# Patient Record
Sex: FEMALE | Race: WHITE | NOT HISPANIC OR LATINO | Employment: FULL TIME | URBAN - METROPOLITAN AREA
[De-identification: names, ages, dates, MRNs, and addresses within clinical notes are randomized per-mention and may not be internally consistent; named-entity substitution may affect disease eponyms.]

---

## 2017-11-06 LAB
EXTERNAL ABO GROUPING: NORMAL
EXTERNAL ANTIBODY SCREEN: NEGATIVE
EXTERNAL HEPATITIS B SURFACE ANTIGEN: NEGATIVE
EXTERNAL RH FACTOR: POSITIVE
EXTERNAL RUBELLA QUALITATIVE: NORMAL
EXTERNAL SYPHILIS RPR SCREEN: NORMAL
HIV1 P24 AG SERPL QL IA: NORMAL

## 2018-05-15 LAB — EXTERNAL GROUP B STREP ANTIGEN: NORMAL

## 2018-05-18 ENCOUNTER — HOSPITAL ENCOUNTER (OUTPATIENT)
Dept: LABOR AND DELIVERY | Facility: HOSPITAL | Age: 38
Setting detail: OBSERVATION
Discharge: HOME | End: 2018-05-18
Attending: OBSTETRICS & GYNECOLOGY | Admitting: OBSTETRICS & GYNECOLOGY

## 2018-05-18 VITALS
WEIGHT: 156 LBS | TEMPERATURE: 98 F | HEIGHT: 63 IN | SYSTOLIC BLOOD PRESSURE: 108 MMHG | RESPIRATION RATE: 17 BRPM | DIASTOLIC BLOOD PRESSURE: 64 MMHG | BODY MASS INDEX: 27.64 KG/M2 | OXYGEN SATURATION: 97 % | HEART RATE: 65 BPM

## 2018-05-18 PROBLEM — Z34.90 PREGNANCY: Status: ACTIVE | Noted: 2018-05-18

## 2018-05-18 LAB
ABO GROUP BLD: NORMAL
BASOPHILS # BLD AUTO: 0.03 10*3/MM3 (ref 0–0.2)
BASOPHILS NFR BLD AUTO: 0.4 % (ref 0–1.5)
BLD GP AB SCN SERPL QL: NEGATIVE
DEPRECATED RDW RBC AUTO: 45.8 FL (ref 37–54)
EOSINOPHIL # BLD AUTO: 0.12 10*3/MM3 (ref 0–0.7)
EOSINOPHIL NFR BLD AUTO: 1.5 % (ref 0.3–6.2)
ERYTHROCYTE [DISTWIDTH] IN BLOOD BY AUTOMATED COUNT: 13.2 % (ref 11.7–13)
EXPIRATION DATE: NORMAL
HCT VFR BLD AUTO: 33.8 % (ref 35.6–45.5)
HGB BLD-MCNC: 11.1 G/DL (ref 11.9–15.5)
IMM GRANULOCYTES # BLD: 0.1 10*3/MM3 (ref 0–0.03)
IMM GRANULOCYTES NFR BLD: 1.2 % (ref 0–0.5)
LYMPHOCYTES # BLD AUTO: 1.81 10*3/MM3 (ref 0.9–4.8)
LYMPHOCYTES NFR BLD AUTO: 22.2 % (ref 19.6–45.3)
Lab: NORMAL
MCH RBC QN AUTO: 31.2 PG (ref 26.9–32)
MCHC RBC AUTO-ENTMCNC: 32.8 G/DL (ref 32.4–36.3)
MCV RBC AUTO: 94.9 FL (ref 80.5–98.2)
MONOCYTES # BLD AUTO: 0.62 10*3/MM3 (ref 0.2–1.2)
MONOCYTES NFR BLD AUTO: 7.6 % (ref 5–12)
NEUTROPHILS # BLD AUTO: 5.47 10*3/MM3 (ref 1.9–8.1)
NEUTROPHILS NFR BLD AUTO: 67.1 % (ref 42.7–76)
PLATELET # BLD AUTO: 133 10*3/MM3 (ref 140–500)
PMV BLD AUTO: 11.2 FL (ref 6–12)
PROT UR STRIP-MCNC: NEGATIVE MG/DL
RBC # BLD AUTO: 3.56 10*6/MM3 (ref 3.9–5.2)
RH BLD: POSITIVE
T&S EXPIRATION DATE: NORMAL
WBC NRBC COR # BLD: 8.15 10*3/MM3 (ref 4.5–10.7)

## 2018-05-18 PROCEDURE — 86850 RBC ANTIBODY SCREEN: CPT | Performed by: OBSTETRICS & GYNECOLOGY

## 2018-05-18 PROCEDURE — G0378 HOSPITAL OBSERVATION PER HR: HCPCS

## 2018-05-18 PROCEDURE — 85025 COMPLETE CBC W/AUTO DIFF WBC: CPT | Performed by: OBSTETRICS & GYNECOLOGY

## 2018-05-18 PROCEDURE — 81002 URINALYSIS NONAUTO W/O SCOPE: CPT | Performed by: OBSTETRICS & GYNECOLOGY

## 2018-05-18 PROCEDURE — 86900 BLOOD TYPING SEROLOGIC ABO: CPT | Performed by: OBSTETRICS & GYNECOLOGY

## 2018-05-18 PROCEDURE — 86901 BLOOD TYPING SEROLOGIC RH(D): CPT | Performed by: OBSTETRICS & GYNECOLOGY

## 2018-05-18 PROCEDURE — 59025 FETAL NON-STRESS TEST: CPT

## 2018-05-18 PROCEDURE — 59412 ANTEPARTUM MANIPULATION: CPT

## 2018-05-18 RX ORDER — BUTORPHANOL TARTRATE 1 MG/ML
1 INJECTION, SOLUTION INTRAMUSCULAR; INTRAVENOUS ONCE AS NEEDED
Status: DISCONTINUED | OUTPATIENT
Start: 2018-05-18 | End: 2018-05-18 | Stop reason: HOSPADM

## 2018-05-18 RX ORDER — TERBUTALINE SULFATE 1 MG/ML
0.25 INJECTION, SOLUTION SUBCUTANEOUS ONCE
Status: DISCONTINUED | OUTPATIENT
Start: 2018-05-18 | End: 2018-05-18 | Stop reason: HOSPADM

## 2018-05-18 RX ORDER — SODIUM CHLORIDE, SODIUM LACTATE, POTASSIUM CHLORIDE, CALCIUM CHLORIDE 600; 310; 30; 20 MG/100ML; MG/100ML; MG/100ML; MG/100ML
125 INJECTION, SOLUTION INTRAVENOUS CONTINUOUS
Status: DISCONTINUED | OUTPATIENT
Start: 2018-05-18 | End: 2018-05-18 | Stop reason: HOSPADM

## 2018-05-18 RX ORDER — LIDOCAINE HYDROCHLORIDE 10 MG/ML
5 INJECTION, SOLUTION EPIDURAL; INFILTRATION; INTRACAUDAL; PERINEURAL AS NEEDED
Status: DISCONTINUED | OUTPATIENT
Start: 2018-05-18 | End: 2018-05-18 | Stop reason: HOSPADM

## 2018-05-18 RX ORDER — PRENATAL VIT NO.126/IRON/FOLIC 28MG-0.8MG
1 TABLET ORAL DAILY
COMMUNITY
End: 2022-03-04

## 2018-05-18 RX ORDER — TERBUTALINE SULFATE 1 MG/ML
0.25 INJECTION, SOLUTION SUBCUTANEOUS ONCE AS NEEDED
Status: DISCONTINUED | OUTPATIENT
Start: 2018-05-18 | End: 2018-05-18 | Stop reason: HOSPADM

## 2018-05-18 RX ORDER — SODIUM CHLORIDE 0.9 % (FLUSH) 0.9 %
1-10 SYRINGE (ML) INJECTION AS NEEDED
Status: DISCONTINUED | OUTPATIENT
Start: 2018-05-18 | End: 2018-05-18 | Stop reason: HOSPADM

## 2018-05-18 RX ADMIN — SODIUM CHLORIDE, POTASSIUM CHLORIDE, SODIUM LACTATE AND CALCIUM CHLORIDE 125 ML/HR: 600; 310; 30; 20 INJECTION, SOLUTION INTRAVENOUS at 08:50

## 2018-05-18 NOTE — H&P
[facility]  Obstetric History and Physical    Patient Name: Starr Ramos  :  1980  MRN:  [id number]      Chief Complaint   Patient presents with   • external version     Sceduled version at 1000. Reports +FM, denies any vaginal bleeding or LOF.        Subjective     Patient is a 37 y.o. female  currently at 37w0d, who presents for attempted version. U/S done yesterday showing baby barry breech, PAUL 7.2 with MVP 3.  Good FM, no UCs       Her prenatal care has been uncomplicated      Objective       Vital Signs Range for the last 24 hours  Temperature: Temp:  [97.7 °F (36.5 °C)] 97.7 °F (36.5 °C)   Temp Source: Temp src: Oral   BP: BP: (119)/(65) 119/65   Pulse: Heart Rate:  [62] 62   Respirations: Resp:  [19] 19                   Physical Examination:     General :  Alert in NAD  Abdomen: Gravid, EFW 6.5lbs by leopolds    Presentation: Barry breech, confirmed with BSUS   Cervix: Exam by:     Dilation:     Effacement:     Station:         Fetal Heart Rate Assessment   Method: Fetal HR Assessment Method: external   Beats/min: Fetal HR (beats/min): 140   Baseline: Fetal Heart Baseline Rate: normal range   Varibility: Fetal HR Variability: moderate (amplitude range 6 to 25 bpm)   Accels: Fetal HR Accelerations: greater than/equal to 15 bpm, lasting at least 15 seconds   Decels: Fetal HR Decelerations: absent   Tracing Category:       Uterine Assessment   Method: Method: external tocotransducer   Frequency (min): Contraction Frequency (Minutes): none   Ctx Count in 10 min:     Duration:     Intensity: Contraction Intensity: no contractions   Intensity by IUPC:     Resting Tone: Uterine Resting Tone: soft by palpation   Resting Tone by IUPC:     [place] Units:             Results from last 7 days  Lab Units 18  0852   WBC 10*3/mm3 8.15   HEMOGLOBIN g/dL 11.1*   HEMATOCRIT % 33.8*   PLATELETS 10*3/mm3 133*       Assessment/Plan     1.  Intrauterine pregnancy at 37w0d weeks gestation for attempted  version.   reactive fetal status.   Plan of care has been reviewed with patient and family.  All questions answered.    Active Problems:    Pregnancy        H&P updated. The patient was examined and the following changes are noted above.         Jackie Feliz MD  5/18/2018  10:22 AM

## 2018-05-18 NOTE — NON STRESS TEST
Starr IVAN Ramos, a  at 37w0d with an COSTA of 2018, by Patient Reported, was seen at [facility] LABOR DELIVERY for a nonstress test.    Fetal monitoring from [id number].    Chief Complaint   Patient presents with   • external version     Sceduled version at 1000. Reports +FM, denies any vaginal bleeding or LOF.        Interpretation A  Nonstress Test Interpretation A: Reactive (18 1130 : Patti Collado RN)

## 2018-05-18 NOTE — PROCEDURES
After informed consent obtained, U/S performed - fetus olaf breech, head midline with fetal spine on maternal left.  Front roll attempted first and then back roll - neither of which resulted in much movement.  Fetus tolerated maneuvers well.  Procedure aborted.  Will complete prolonged monitoring and d/c home if reassuring.  Blood type O+.  Has appt early next week with U/S to f/u.    Jackie Feliz MD

## 2018-06-01 ENCOUNTER — HOSPITAL ENCOUNTER (INPATIENT)
Facility: HOSPITAL | Age: 38
LOS: 4 days | Discharge: HOME | End: 2018-06-05
Attending: OBSTETRICS & GYNECOLOGY | Admitting: OBSTETRICS & GYNECOLOGY

## 2018-06-01 ENCOUNTER — ANESTHESIA (OUTPATIENT)
Dept: LABOR AND DELIVERY | Facility: HOSPITAL | Age: 38
End: 2018-06-01

## 2018-06-01 ENCOUNTER — ANESTHESIA EVENT (OUTPATIENT)
Dept: LABOR AND DELIVERY | Facility: HOSPITAL | Age: 38
End: 2018-06-01

## 2018-06-01 LAB
ABO GROUP BLD: NORMAL
ATMOSPHERIC PRESS: 745.6 MMHG
BASE EXCESS BLDCOA CALC-SCNC: -4.7 MMOL/L
BDY SITE: ABNORMAL
BLD GP AB SCN SERPL QL: NEGATIVE
DEPRECATED RDW RBC AUTO: 45.8 FL (ref 37–54)
ERYTHROCYTE [DISTWIDTH] IN BLOOD BY AUTOMATED COUNT: 13.5 % (ref 11.7–13)
GAS FLOW AIRWAY: 2 LPM
HCO3 BLDCOA-SCNC: 24.4 MMOL/L (ref 22–28)
HCT VFR BLD AUTO: 34.1 % (ref 35.6–45.5)
HGB BLD-MCNC: 11.3 G/DL (ref 11.9–15.5)
MCH RBC QN AUTO: 30.9 PG (ref 26.9–32)
MCHC RBC AUTO-ENTMCNC: 33.1 G/DL (ref 32.4–36.3)
MCV RBC AUTO: 93.2 FL (ref 80.5–98.2)
MODALITY: ABNORMAL
PCO2 BLDCOA: 61.2 MMHG
PH BLDCOA: 7.21 PH UNITS (ref 7.18–7.34)
PLATELET # BLD AUTO: 141 10*3/MM3 (ref 140–500)
PMV BLD AUTO: 10.7 FL (ref 6–12)
PO2 BLDCOA: <25.2 MMHG (ref 12–26)
RBC # BLD AUTO: 3.66 10*6/MM3 (ref 3.9–5.2)
RH BLD: POSITIVE
SAO2 % BLDCOA: 14.4 % (ref 92–99)
T&S EXPIRATION DATE: NORMAL
WBC NRBC COR # BLD: 7.24 10*3/MM3 (ref 4.5–10.7)

## 2018-06-01 PROCEDURE — 85027 COMPLETE CBC AUTOMATED: CPT | Performed by: OBSTETRICS & GYNECOLOGY

## 2018-06-01 PROCEDURE — 25010000003 CEFAZOLIN IN DEXTROSE 2-4 GM/100ML-% SOLUTION: Performed by: OBSTETRICS & GYNECOLOGY

## 2018-06-01 PROCEDURE — 25010000002 FENTANYL CITRATE (PF) 100 MCG/2ML SOLUTION: Performed by: ANESTHESIOLOGY

## 2018-06-01 PROCEDURE — 86901 BLOOD TYPING SEROLOGIC RH(D): CPT | Performed by: OBSTETRICS & GYNECOLOGY

## 2018-06-01 PROCEDURE — 25010000002 MORPHINE PER 10 MG: Performed by: ANESTHESIOLOGY

## 2018-06-01 PROCEDURE — 25010000002 ONDANSETRON PER 1 MG: Performed by: ANESTHESIOLOGY

## 2018-06-01 PROCEDURE — 25010000002 PHENYLEPHRINE PER 1 ML: Performed by: ANESTHESIOLOGY

## 2018-06-01 PROCEDURE — 86900 BLOOD TYPING SEROLOGIC ABO: CPT | Performed by: OBSTETRICS & GYNECOLOGY

## 2018-06-01 PROCEDURE — 82803 BLOOD GASES ANY COMBINATION: CPT

## 2018-06-01 PROCEDURE — 86850 RBC ANTIBODY SCREEN: CPT | Performed by: OBSTETRICS & GYNECOLOGY

## 2018-06-01 RX ORDER — PROMETHAZINE HYDROCHLORIDE 25 MG/1
25 TABLET ORAL EVERY 6 HOURS PRN
Status: DISCONTINUED | OUTPATIENT
Start: 2018-06-01 | End: 2018-06-05 | Stop reason: HOSPADM

## 2018-06-01 RX ORDER — IBUPROFEN 800 MG/1
800 TABLET ORAL EVERY 8 HOURS PRN
Status: DISCONTINUED | OUTPATIENT
Start: 2018-06-01 | End: 2018-06-05 | Stop reason: HOSPADM

## 2018-06-01 RX ORDER — PRENATAL VIT NO.126/IRON/FOLIC 28MG-0.8MG
1 TABLET ORAL DAILY
Status: DISCONTINUED | OUTPATIENT
Start: 2018-06-01 | End: 2018-06-05 | Stop reason: HOSPADM

## 2018-06-01 RX ORDER — BUPIVACAINE HYDROCHLORIDE 5 MG/ML
INJECTION, SOLUTION EPIDURAL; INTRACAUDAL AS NEEDED
Status: DISCONTINUED | OUTPATIENT
Start: 2018-06-01 | End: 2018-06-01 | Stop reason: SURG

## 2018-06-01 RX ORDER — ONDANSETRON 2 MG/ML
4 INJECTION INTRAMUSCULAR; INTRAVENOUS ONCE AS NEEDED
Status: DISPENSED | OUTPATIENT
Start: 2018-06-01 | End: 2018-06-02

## 2018-06-01 RX ORDER — ONDANSETRON 4 MG/1
4 TABLET, ORALLY DISINTEGRATING ORAL EVERY 6 HOURS PRN
Status: DISCONTINUED | OUTPATIENT
Start: 2018-06-01 | End: 2018-06-05 | Stop reason: HOSPADM

## 2018-06-01 RX ORDER — SODIUM CHLORIDE, SODIUM LACTATE, POTASSIUM CHLORIDE, CALCIUM CHLORIDE 600; 310; 30; 20 MG/100ML; MG/100ML; MG/100ML; MG/100ML
125 INJECTION, SOLUTION INTRAVENOUS CONTINUOUS
Status: DISCONTINUED | OUTPATIENT
Start: 2018-06-01 | End: 2018-06-04 | Stop reason: HOSPADM

## 2018-06-01 RX ORDER — FENTANYL CITRATE 50 UG/ML
INJECTION, SOLUTION INTRAMUSCULAR; INTRAVENOUS AS NEEDED
Status: DISCONTINUED | OUTPATIENT
Start: 2018-06-01 | End: 2018-06-01 | Stop reason: SURG

## 2018-06-01 RX ORDER — EPHEDRINE SULFATE 50 MG/ML
INJECTION, SOLUTION INTRAVENOUS AS NEEDED
Status: DISCONTINUED | OUTPATIENT
Start: 2018-06-01 | End: 2018-06-01 | Stop reason: SURG

## 2018-06-01 RX ORDER — NALOXONE HCL 0.4 MG/ML
0.2 VIAL (ML) INJECTION
Status: DISCONTINUED | OUTPATIENT
Start: 2018-06-01 | End: 2018-06-05 | Stop reason: HOSPADM

## 2018-06-01 RX ORDER — MORPHINE SULFATE 2 MG/ML
2 INJECTION, SOLUTION INTRAMUSCULAR; INTRAVENOUS EVERY 4 HOURS PRN
Status: DISCONTINUED | OUTPATIENT
Start: 2018-06-01 | End: 2018-06-05 | Stop reason: HOSPADM

## 2018-06-01 RX ORDER — FAMOTIDINE 10 MG/ML
20 INJECTION, SOLUTION INTRAVENOUS ONCE
Status: COMPLETED | OUTPATIENT
Start: 2018-06-01 | End: 2018-06-01

## 2018-06-01 RX ORDER — PROMETHAZINE HYDROCHLORIDE 25 MG/1
12.5 SUPPOSITORY RECTAL EVERY 6 HOURS PRN
Status: DISCONTINUED | OUTPATIENT
Start: 2018-06-01 | End: 2018-06-05 | Stop reason: HOSPADM

## 2018-06-01 RX ORDER — LANOLIN 100 %
OINTMENT (GRAM) TOPICAL
Status: DISCONTINUED | OUTPATIENT
Start: 2018-06-01 | End: 2018-06-05 | Stop reason: HOSPADM

## 2018-06-01 RX ORDER — DIPHENHYDRAMINE HCL 25 MG
25 CAPSULE ORAL EVERY 4 HOURS PRN
Status: DISCONTINUED | OUTPATIENT
Start: 2018-06-01 | End: 2018-06-05 | Stop reason: HOSPADM

## 2018-06-01 RX ORDER — OXYTOCIN-SODIUM CHLORIDE 0.9% IV SOLN 30 UNIT/500ML 30-0.9/5 UT/ML-%
250 SOLUTION INTRAVENOUS CONTINUOUS
Status: ACTIVE | OUTPATIENT
Start: 2018-06-01 | End: 2018-06-01

## 2018-06-01 RX ORDER — ONDANSETRON 4 MG/1
4 TABLET, FILM COATED ORAL EVERY 6 HOURS PRN
Status: DISCONTINUED | OUTPATIENT
Start: 2018-06-01 | End: 2018-06-05 | Stop reason: HOSPADM

## 2018-06-01 RX ORDER — NALOXONE HCL 0.4 MG/ML
0.4 VIAL (ML) INJECTION
Status: DISCONTINUED | OUTPATIENT
Start: 2018-06-01 | End: 2018-06-05 | Stop reason: HOSPADM

## 2018-06-01 RX ORDER — CARBOPROST TROMETHAMINE 250 UG/ML
250 INJECTION, SOLUTION INTRAMUSCULAR AS NEEDED
Status: DISCONTINUED | OUTPATIENT
Start: 2018-06-01 | End: 2018-06-01 | Stop reason: HOSPADM

## 2018-06-01 RX ORDER — DIPHENHYDRAMINE HYDROCHLORIDE 50 MG/ML
25 INJECTION INTRAMUSCULAR; INTRAVENOUS EVERY 4 HOURS PRN
Status: DISCONTINUED | OUTPATIENT
Start: 2018-06-01 | End: 2018-06-05 | Stop reason: HOSPADM

## 2018-06-01 RX ORDER — OXYTOCIN-SODIUM CHLORIDE 0.9% IV SOLN 30 UNIT/500ML 30-0.9/5 UT/ML-%
125 SOLUTION INTRAVENOUS CONTINUOUS PRN
Status: COMPLETED | OUTPATIENT
Start: 2018-06-01 | End: 2018-06-01

## 2018-06-01 RX ORDER — DOCUSATE SODIUM 100 MG/1
100 CAPSULE, LIQUID FILLED ORAL 2 TIMES DAILY PRN
Status: DISCONTINUED | OUTPATIENT
Start: 2018-06-01 | End: 2018-06-05 | Stop reason: HOSPADM

## 2018-06-01 RX ORDER — OXYTOCIN-SODIUM CHLORIDE 0.9% IV SOLN 30 UNIT/500ML 30-0.9/5 UT/ML-%
999 SOLUTION INTRAVENOUS ONCE
Status: COMPLETED | OUTPATIENT
Start: 2018-06-01 | End: 2018-06-01

## 2018-06-01 RX ORDER — MISOPROSTOL 200 UG/1
600 TABLET ORAL ONCE AS NEEDED
Status: DISCONTINUED | OUTPATIENT
Start: 2018-06-01 | End: 2018-06-05 | Stop reason: HOSPADM

## 2018-06-01 RX ORDER — METHYLERGONOVINE MALEATE 0.2 MG/ML
200 INJECTION INTRAVENOUS ONCE AS NEEDED
Status: DISCONTINUED | OUTPATIENT
Start: 2018-06-01 | End: 2018-06-01 | Stop reason: HOSPADM

## 2018-06-01 RX ORDER — BISACODYL 10 MG
10 SUPPOSITORY, RECTAL RECTAL DAILY PRN
Status: DISCONTINUED | OUTPATIENT
Start: 2018-06-01 | End: 2018-06-05 | Stop reason: HOSPADM

## 2018-06-01 RX ORDER — ONDANSETRON 2 MG/ML
4 INJECTION INTRAMUSCULAR; INTRAVENOUS EVERY 6 HOURS PRN
Status: DISCONTINUED | OUTPATIENT
Start: 2018-06-01 | End: 2018-06-05 | Stop reason: HOSPADM

## 2018-06-01 RX ORDER — HYDROXYZINE 50 MG/1
50 TABLET, FILM COATED ORAL EVERY 6 HOURS PRN
Status: DISCONTINUED | OUTPATIENT
Start: 2018-06-01 | End: 2018-06-05 | Stop reason: HOSPADM

## 2018-06-01 RX ORDER — ACETAMINOPHEN 500 MG
1000 TABLET ORAL ONCE
Status: COMPLETED | OUTPATIENT
Start: 2018-06-01 | End: 2018-06-01

## 2018-06-01 RX ORDER — PROMETHAZINE HYDROCHLORIDE 25 MG/ML
12.5 INJECTION, SOLUTION INTRAMUSCULAR; INTRAVENOUS EVERY 6 HOURS PRN
Status: DISCONTINUED | OUTPATIENT
Start: 2018-06-01 | End: 2018-06-05 | Stop reason: HOSPADM

## 2018-06-01 RX ORDER — OXYCODONE HYDROCHLORIDE AND ACETAMINOPHEN 5; 325 MG/1; MG/1
2 TABLET ORAL EVERY 4 HOURS PRN
Status: DISCONTINUED | OUTPATIENT
Start: 2018-06-01 | End: 2018-06-05 | Stop reason: HOSPADM

## 2018-06-01 RX ORDER — HYDROMORPHONE HYDROCHLORIDE 1 MG/ML
0.5 INJECTION, SOLUTION INTRAMUSCULAR; INTRAVENOUS; SUBCUTANEOUS
Status: DISCONTINUED | OUTPATIENT
Start: 2018-06-01 | End: 2018-06-05 | Stop reason: HOSPADM

## 2018-06-01 RX ORDER — MISOPROSTOL 200 UG/1
800 TABLET ORAL AS NEEDED
Status: DISCONTINUED | OUTPATIENT
Start: 2018-06-01 | End: 2018-06-01 | Stop reason: HOSPADM

## 2018-06-01 RX ORDER — ACETAMINOPHEN 325 MG/1
650 TABLET ORAL EVERY 4 HOURS PRN
Status: DISCONTINUED | OUTPATIENT
Start: 2018-06-01 | End: 2018-06-05 | Stop reason: HOSPADM

## 2018-06-01 RX ORDER — MORPHINE SULFATE 2 MG/ML
2 INJECTION, SOLUTION INTRAMUSCULAR; INTRAVENOUS
Status: ACTIVE | OUTPATIENT
Start: 2018-06-01 | End: 2018-06-02

## 2018-06-01 RX ORDER — OXYCODONE HYDROCHLORIDE AND ACETAMINOPHEN 5; 325 MG/1; MG/1
1 TABLET ORAL EVERY 4 HOURS PRN
Status: DISCONTINUED | OUTPATIENT
Start: 2018-06-01 | End: 2018-06-05 | Stop reason: HOSPADM

## 2018-06-01 RX ORDER — CEFAZOLIN SODIUM 2 G/100ML
2 INJECTION, SOLUTION INTRAVENOUS ONCE
Status: COMPLETED | OUTPATIENT
Start: 2018-06-01 | End: 2018-06-01

## 2018-06-01 RX ORDER — MORPHINE SULFATE 1 MG/ML
INJECTION, SOLUTION EPIDURAL; INTRATHECAL; INTRAVENOUS AS NEEDED
Status: DISCONTINUED | OUTPATIENT
Start: 2018-06-01 | End: 2018-06-01 | Stop reason: SURG

## 2018-06-01 RX ORDER — SIMETHICONE 80 MG
80 TABLET,CHEWABLE ORAL 4 TIMES DAILY PRN
Status: DISCONTINUED | OUTPATIENT
Start: 2018-06-01 | End: 2018-06-05 | Stop reason: HOSPADM

## 2018-06-01 RX ADMIN — EPHEDRINE SULFATE 25 MG: 50 INJECTION INTRAMUSCULAR; INTRAVENOUS; SUBCUTANEOUS at 10:55

## 2018-06-01 RX ADMIN — FENTANYL CITRATE 20 MCG: 50 INJECTION INTRAMUSCULAR; INTRAVENOUS at 10:53

## 2018-06-01 RX ADMIN — CEFAZOLIN SODIUM 2 G: 2 INJECTION, SOLUTION INTRAVENOUS at 10:37

## 2018-06-01 RX ADMIN — BUPIVACAINE HYDROCHLORIDE 2.4 ML: 5 INJECTION, SOLUTION EPIDURAL; INTRACAUDAL; PERINEURAL at 10:53

## 2018-06-01 RX ADMIN — ONDANSETRON 4 MG: 2 INJECTION INTRAMUSCULAR; INTRAVENOUS at 13:57

## 2018-06-01 RX ADMIN — OXYTOCIN 999 ML/HR: 10 INJECTION INTRAVENOUS at 11:14

## 2018-06-01 RX ADMIN — ACETAMINOPHEN 1000 MG: 500 TABLET, FILM COATED ORAL at 10:09

## 2018-06-01 RX ADMIN — SODIUM CHLORIDE, POTASSIUM CHLORIDE, SODIUM LACTATE AND CALCIUM CHLORIDE: 600; 310; 30; 20 INJECTION, SOLUTION INTRAVENOUS at 10:48

## 2018-06-01 RX ADMIN — IBUPROFEN 800 MG: 800 TABLET ORAL at 14:44

## 2018-06-01 RX ADMIN — OXYTOCIN 125 ML/HR: 10 INJECTION, SOLUTION INTRAMUSCULAR; INTRAVENOUS at 12:30

## 2018-06-01 RX ADMIN — MORPHINE SULFATE 0.25 MG: 1 INJECTION EPIDURAL; INTRATHECAL; INTRAVENOUS at 10:53

## 2018-06-01 RX ADMIN — FAMOTIDINE 20 MG: 10 INJECTION, SOLUTION INTRAVENOUS at 10:10

## 2018-06-01 RX ADMIN — PHENYLEPHRINE HYDROCHLORIDE 100 MCG: 10 INJECTION INTRAVENOUS at 10:56

## 2018-06-01 RX ADMIN — PHENYLEPHRINE HYDROCHLORIDE 100 MCG: 10 INJECTION INTRAVENOUS at 11:00

## 2018-06-01 RX ADMIN — SODIUM CHLORIDE, POTASSIUM CHLORIDE, SODIUM LACTATE AND CALCIUM CHLORIDE 1000 ML: 600; 310; 30; 20 INJECTION, SOLUTION INTRAVENOUS at 10:00

## 2018-06-01 RX ADMIN — OXYCODONE HYDROCHLORIDE AND ACETAMINOPHEN 2 TABLET: 5; 325 TABLET ORAL at 14:44

## 2018-06-01 NOTE — ANESTHESIA PREPROCEDURE EVALUATION
Anesthesia Evaluation     Patient summary reviewed and Nursing notes reviewed   NPO Solid Status: > 6 hours  NPO Liquid Status: > 6 hours           Airway   Mallampati: I  TM distance: <3 FB  Neck ROM: full  no difficulty expected  Dental - normal exam     Pulmonary - negative pulmonary ROS and normal exam   Cardiovascular - normal exam  Exercise tolerance: good (4-7 METS)    (+) valvular problems/murmurs,       Neuro/Psych  (+) headaches,     GI/Hepatic/Renal/Endo    (+)   hypothyroidism,     Musculoskeletal (-) negative ROS    Abdominal  - normal exam    Bowel sounds: normal.   Substance History - negative use     OB/GYN    (+) Pregnant,         Other                        Anesthesia Plan    ASA 2     spinal     Anesthetic plan and risks discussed with patient.

## 2018-06-01 NOTE — ANESTHESIA POSTPROCEDURE EVALUATION
Patient: Starr Ramos    Procedure Summary     Date:  18 Room / Location:   [facility] /  [facility]    Anesthesia Start:  1050 Anesthesia Stop:  1145    Procedure:   SECTION PRIMARY (N/A Abdomen) Diagnosis:  (breech)    Surgeon:  Ted Martinez MD Provider:  Mark Chan MD    Anesthesia Type:  spinal ASA Status:  2          Anesthesia Type: spinal  Last vitals  BP   118/73 (18 1247)   Temp   36.3 °C (97.4 °F) (18 1145)   Pulse   67 (18 1247)   Resp   16 (18 1245)     SpO2   98 % (18 1246)     Post Anesthesia Care and Evaluation    Patient location during evaluation: PACU  Anesthetic complications: No anesthetic complications

## 2018-06-01 NOTE — H&P
"/78 (BP Location: Right arm, Patient Position: Sitting)   Pulse 69   Temp 98.2 °F (36.8 °C) (Oral)   Resp 16   Ht 160 cm (63\")   Wt 71.2 kg (157 lb)   SpO2 98%   Breastfeeding? Yes   BMI 27.81 kg/m²       H&P reviewed. The patient was examined and there are no changes to the H&P.    Ted Martinez MD  6/1/2018  10:45 AM      "

## 2018-06-01 NOTE — ANESTHESIA PROCEDURE NOTES
Intrathecal Block    Patient location during procedure: OR  Indication:at surgeon's request  Performed By  Anesthesiologist: TRAVIS DESIR  Preanesthetic Checklist  Completed: patient identified, site marked, surgical consent, pre-op evaluation, timeout performed, IV checked, risks and benefits discussed and monitors and equipment checked  Intrathecal Block Prep:  Pt Position:sitting  Sterile Tech:sterile barrier, gloves, cap and mask  Prep:chloraprep  Monitoring:blood pressure monitoring, continuous pulse oximetry and EKG  Intrathecal Block Procedure:  Approach:midline  Guidance:landmark technique  Location:L3-L4  Needle Type:Sprotte  Needle Gauge:24 G  Placement of Needle Event: cerebrospinal fluid  Fluid Appearance:clear  Post Assessment:  Patient Tolerance:patient tolerated the procedure well with no apparent complications

## 2018-06-01 NOTE — OP NOTE
[facility]   Section Operative Note    Patient Name: Starr Ramos  :  1980  MRN:  [id number]      Date of procedure:  2018        Pre-Operative Dx:   1.  IUP at 39w0d weeks   2.   Breech Presentation s/p unsuccessful ECV @ 37 wks        Postoperative Dx:  Same        Procedure:   Primary Low Transverse  Section via Pfannenstiel     Surgeon: Ted Martinez MD      Assistant: Archana Mccain MD     Anesthesia:   Spinal    EBL: 800 ml         Specimens: A: cord ph  B: cord blood  C: placenta      Findings:                           Amniotic Fluid - thin meconium  Placenta Intact, 3 VC  Uterus normal  Tubes and ovaries normal bilaterally              Infant:            Gender: Viable male  infant     Weight: 3280 g (7 lb 3.7 oz)     Apgars: 8   @ 1 minute /     8   @ 5 minutes                 Indication for C/Section:       Breech presentation               Procedure Details:  The patient was taken to the operating room where spinal anesthesia was found to be adequate. She was prepped and draped in the usual sterile manner in the dorsal supine position with leftward tilt. A Pfannenstiel incision was made and carried down to the fascia. The fascia was incised in the mid-line and extended transversely with Bedolla scissors. The fascia was grasped and elevated and  from the underlying rectus muscles superiorly and inferiorly. The peritoneum was identified and entered. Peritoneal incision was extended superiorly and inferiorly with good visualization of the bladder. The bladder blade was inserted and the vesicouterine peritoneum was incised transversely and the bladder flap was created digitally. The bladder blade was reinserted. The  lower uterine segment was incised in a transverse fashion.  The breech was delivered through the incision, then  remainder of the infant was delivered without difficulty. The head was flexed and delivered easily. The umbilical cord was clamped and cut  and the infant was handed off the field. Cord ph and cord bloods were obtained. The placenta was removed intact and appeared normal. The uterine incision was closed with running locked sutures of 0 Monocryl. Second layer closure was placed imbricating the first for hemostasis. The abdominal cavity was irrigated and cleared of all clot and debris. The uterine incision was inspected and noted to be hemostatic. Rectus muscles were re approximated in the midline with 0 Vicryl.  The fascia was closed with 0 PDS in running continuous fashion. The subcutaneous tissue was closed with 3-0 Vicryl. The skin was closed in subcuticular fashion with 4-0 Vicryl.   Instrument, sponge, and needle counts were correct prior the abdominal closure and at the conclusion of the case. Mother and baby  to recovery room in stable condition.              Complications:     None          Antibiotics: cefazolin (Ancef)                      Ted Martinez MD  6/1/2018  11:38 AM

## 2018-06-01 NOTE — LACTATION NOTE
P1. Term infant.  Pt states infant has had some successful sucks at the breast.  Attempted a feeding now but infant sleepy.  Pt to do CECI and hand express drops.    Lactation Consult Note    Evaluation Completed: 2018 7:14 PM  Patient Name: Starr Ramos  :  1980  MRN:  [id number]     REFERRAL  INFORMATION:                          Date of Referral: 18   Person Making Referral: patient  Maternal Reason for Referral: breastfeeding currently       DELIVERY HISTORY:          Skin to skin initiation date/time: 2018  11:30 AM   Skin to skin end date/time:              MATERNAL ASSESSMENT:  Breast Size Issue: none (18 : Mally Ortega RN)  Breast Shape: Bilateral:, round (18 : Mally Ortega RN)  Breast Density: Bilateral:, soft (18 : Mally Ortega RN)  Areola: Bilateral:, elastic (18 : Mally Ortega RN)  Nipples: Bilateral:, everted (18 : Mally Ortega RN)                INFANT ASSESSMENT:  Information for the patient's :  Ramos Ramos [[id number]]   No past medical history on file.    Feeding Readiness Cues: quiet (18Alfredo : Mally Ortega RN)   Feeding Method: breastfeeding (18 : Mally Ortega RN)   Feeding Tolerance/Success: sleepy (18Alfredo : Mally Ortega RN)                           Additional Documentation: LATCH Score (Group) (18Alfredo : Mally Ortega RN)           Breastfeeding: breastfeeding, bilateral (18 : Mally Ortega, RN)   Infant Positioning: clutch/football, cradle, cross-cradle, laid back (ventral) (18Alfredo : Mally Ortega, ISAIAS)           Effective Latch During Feeding: no (18Alfredo : Mally Ortega RN)   Suck/Swallow Coordination: present (18Alfredo : Mally Ortega RN)           Latch: 0-->too sleepy or reluctant, no latch achieved (18Alfredo : Mally Ortega RN)   Audible Swallowin-->none (18 : Mally Ortega RN)   Type of Nipple:  2-->everted (after stimulation) (06/01/18 1909 : Mally Ortega RN)   Comfort (Breast/Nipple): 2-->soft/nontender (06/01/18 1909 : Mally Ortega, RN)   Hold (Positioning): 1-->minimal assist, teach one side, mother does other, staff holds (06/01/18 1909 : Mally Ortega, RN)   Score: 5 (06/01/18 1909 : Mally Ortega, RN)     Infant-Driven Feeding Scales - Readiness: Alert once handled. Some rooting or takes pacifier. Adequate tone. (06/01/18 1909 : Mally Ortega, RN)                 MATERNAL INFANT FEEDING:  Maternal Preparation: breast care (06/01/18 1909 : Mally Ortega RN)  Maternal Emotional State: relaxed (06/01/18 1909 : Mally Ortega, RN)  Infant Positioning: clutch/football, cradle, cross-cradle, laid back (ventral) (06/01/18 1909 : Mally Ortega RN)                  Milk Ejection Reflex: present (06/01/18 1909 : Mally Ortega, RN)  Comfort Measures Following Feeding: air-drying encouraged (06/01/18 1909 : Mally Ortega, RN)        Latch Assistance: yes (06/01/18 1909 : Mally Ortega RN)                               EQUIPMENT TYPE:             Breast Care: Breastfeeding: milk massaged towards nipple, open to air (06/01/18 1909 : Mally Ortega, RN)  Breastfeeding Assistance: feeding cue recognition promoted, hand expression, infant stimulated to wakeful state, support offered (06/01/18 1909 : Mally Ortega, RN)     Breastfeeding Support: encouragement provided, lactation counseling provided, diary/feeding log utilized, infant-mother separation minimized, maternal hydration promoted, maternal rest encouraged, maternal nutrition promoted (06/01/18 1909 : Mally Ortega, RN)          BREAST PUMPING:          LACTATION REFERRALS:

## 2018-06-02 LAB
BASOPHILS # BLD AUTO: 0.03 10*3/MM3 (ref 0–0.2)
BASOPHILS NFR BLD AUTO: 0.3 % (ref 0–1.5)
DEPRECATED RDW RBC AUTO: 46 FL (ref 37–54)
EOSINOPHIL # BLD AUTO: 0.09 10*3/MM3 (ref 0–0.7)
EOSINOPHIL NFR BLD AUTO: 0.8 % (ref 0.3–6.2)
ERYTHROCYTE [DISTWIDTH] IN BLOOD BY AUTOMATED COUNT: 13.3 % (ref 11.7–13)
HCT VFR BLD AUTO: 33 % (ref 35.6–45.5)
HGB BLD-MCNC: 10.8 G/DL (ref 11.9–15.5)
IMM GRANULOCYTES # BLD: 0.04 10*3/MM3 (ref 0–0.03)
IMM GRANULOCYTES NFR BLD: 0.3 % (ref 0–0.5)
LYMPHOCYTES # BLD AUTO: 2.05 10*3/MM3 (ref 0.9–4.8)
LYMPHOCYTES NFR BLD AUTO: 17.2 % (ref 19.6–45.3)
MCH RBC QN AUTO: 31.6 PG (ref 26.9–32)
MCHC RBC AUTO-ENTMCNC: 32.7 G/DL (ref 32.4–36.3)
MCV RBC AUTO: 96.5 FL (ref 80.5–98.2)
MONOCYTES # BLD AUTO: 0.73 10*3/MM3 (ref 0.2–1.2)
MONOCYTES NFR BLD AUTO: 6.1 % (ref 5–12)
NEUTROPHILS # BLD AUTO: 8.99 10*3/MM3 (ref 1.9–8.1)
NEUTROPHILS NFR BLD AUTO: 75.3 % (ref 42.7–76)
PLATELET # BLD AUTO: 142 10*3/MM3 (ref 140–500)
PMV BLD AUTO: 10.9 FL (ref 6–12)
RBC # BLD AUTO: 3.42 10*6/MM3 (ref 3.9–5.2)
WBC NRBC COR # BLD: 11.93 10*3/MM3 (ref 4.5–10.7)

## 2018-06-02 PROCEDURE — 85025 COMPLETE CBC W/AUTO DIFF WBC: CPT | Performed by: OBSTETRICS & GYNECOLOGY

## 2018-06-02 RX ADMIN — ONDANSETRON 4 MG: 4 TABLET, FILM COATED ORAL at 07:57

## 2018-06-02 RX ADMIN — IBUPROFEN 800 MG: 800 TABLET ORAL at 20:03

## 2018-06-02 RX ADMIN — OXYCODONE HYDROCHLORIDE AND ACETAMINOPHEN 1 TABLET: 5; 325 TABLET ORAL at 12:07

## 2018-06-02 RX ADMIN — SIMETHICONE CHEW TAB 80 MG 80 MG: 80 TABLET ORAL at 15:01

## 2018-06-02 RX ADMIN — IBUPROFEN 800 MG: 800 TABLET ORAL at 02:05

## 2018-06-02 RX ADMIN — IBUPROFEN 800 MG: 800 TABLET ORAL at 12:07

## 2018-06-02 RX ADMIN — OXYCODONE HYDROCHLORIDE AND ACETAMINOPHEN 2 TABLET: 5; 325 TABLET ORAL at 16:19

## 2018-06-02 RX ADMIN — SIMETHICONE CHEW TAB 80 MG 80 MG: 80 TABLET ORAL at 20:03

## 2018-06-02 RX ADMIN — OXYCODONE HYDROCHLORIDE AND ACETAMINOPHEN 1 TABLET: 5; 325 TABLET ORAL at 07:57

## 2018-06-02 RX ADMIN — OXYCODONE HYDROCHLORIDE AND ACETAMINOPHEN 2 TABLET: 5; 325 TABLET ORAL at 20:03

## 2018-06-02 RX ADMIN — OXYCODONE HYDROCHLORIDE AND ACETAMINOPHEN 1 TABLET: 5; 325 TABLET ORAL at 02:05

## 2018-06-02 NOTE — LACTATION NOTE
P1, AMA 37, C/S for breech. Baby Issac is now 24 hours old and has not established a good feeding pattern. He has has several stools and wets from swallowing amniotic fluid . Is starting to show some feeding cues , rooting and fist to mouth. He had trouble getting a deep latch but is vigorous for a few sucks when he finds it. He loses the nipple frequently and a nipple shield was tried but did not help much. Mom is so patient and works well with him.  HGP was initiated with instructions to pump q 2 hours today and feed back EBM. Unable to obtain colostrum from pumping or hand expressing . Baby in skin to skin.    Lactation Consult Note    Evaluation Completed: 2018 11:34 AM  Patient Name: Starr Ramos  :  1980  MRN:  [id number]     REFERRAL  INFORMATION:                          Date of Referral: 18   Person Making Referral: patient  Maternal Reason for Referral: breastfeeding currently       DELIVERY HISTORY:          Skin to skin initiation date/time: 2018  11:30 AM   Skin to skin end date/time:              MATERNAL ASSESSMENT:                               INFANT ASSESSMENT:  Information for the patient's :  Ramos Ramos [[id number]]   No past medical history on file.    Feeding Readiness Cues: hand to mouth movements, rooting (18 1122 : Cori Mcelroy RN)   Feeding Method: breastfeeding (18 1122 : Cori Mcelroy RN)   Feeding Tolerance/Success: arousal required, suck inconsistent (18 1122 : Cori Mcelroy RN)                   Feeding Interventions: arousal required, feeding cues monitored, latch assistance provided (18 1122 : Cori Mcelroy RN)   Nutrition Interventions: lactation consult initiated (18 1122 : Cori Mcelroy RN)   Additional Documentation: LATCH Score (Group) (18 1122 : Cori Mcelroy RN)           Breastfeeding: breastfeeding, bilateral (18 1122 : Cori Mcelroy RN)   Infant  Positioning: clutch/football, cross-cradle, laid back (ventral) (182 : Cori Mcelroy RN)   Breastfeeding Time, Left (min): 3 (182 : Cori Mcelroy RN)       Effective Latch During Feeding: no (18 : Cori Mcleroy RN)               Latch: 1-->repeated attempts, holds nipple in mouth, stimulate to suck (182 : Cori Mcelroy RN)   Audible Swallowin-->none (18 : Cori Mcelroy RN)   Type of Nipple: 2-->everted (after stimulation) (18 : Cori Mcelroy RN)   Comfort (Breast/Nipple): 2-->soft/nontender (18 : Cori Mcelroy RN)   Hold (Positioning): 1-->minimal assist, teach one side, mother does other, staff holds (18 : Cori Mcelroy RN)   Score: 6 (18 : Cori Mcelroy RN)     Infant-Driven Feeding Scales - Readiness: Alert once handled. Some rooting or takes pacifier. Adequate tone. (18 : Cori Mcelroy RN)                 MATERNAL INFANT FEEDING:                                                                       EQUIPMENT TYPE:                                 BREAST PUMPING:          LACTATION REFERRALS:

## 2018-06-02 NOTE — PROGRESS NOTES
[facility]   PROGRESS NOTE    Patient Name: Starr Ramos  :  1980  MRN:  [id number]      Post-Op Day 1 S/P    Delivered a male infant.  Subjective     Patient reports:    Pain is well controlled. Voiding and ambulating without difficulty.    Tolerating po. Lochia normal.       The patient plans to breastfeed.         Objective       Vitals: Vital Signs Range for the last 24 hours  Temperature: Temp:  [96 °F (35.6 °C)-98.6 °F (37 °C)] 97.9 °F (36.6 °C)   Temp Source: Temp src: Oral   BP: BP: ()/(58-78) 96/58   Pulse: Heart Rate:  [59-84] 59   Respirations: Resp:  [16-18] 16         Intake/Output Summary (Last 24 hours) at 18 0848  Last data filed at 18 0615   Gross per 24 hour   Intake             2342 ml   Output             2455 ml   Net             -113 ml                                              Physical Exam     General Alert and awake, in NAD      CV Regular rate and rhythm      Lungs clear to auscultation bilaterally        Abdomen Soft, non-distended, fundus firm,  U-2 below umbilicus, appropriately tender      Incision  Dressing clean, dry and intact.      Extremities  min edema, calves NT               LABS: Lab Results   Component Value Date    WBC 11.93 (H) 2018    HGB 10.8 (L) 2018    HCT 33.0 (L) 2018    MCV 96.5 2018     2018     Prenatal labs results reviewed:  Yes   Rubella:  immune  Rh Status:  RH type   Date Value Ref Range Status   2018 Positive  Final                       Assessment/Plan   : 1. POD 1 S/P C/S: Hemodynamically stable.  Doing well.  Continue routine care. Still undecided about circumcision.    Active Problems:    Pregnancy    Delivered by  section          Jackie Feliz MD  2018  8:48 AM

## 2018-06-03 RX ADMIN — DOCUSATE SODIUM 100 MG: 100 CAPSULE, LIQUID FILLED ORAL at 08:35

## 2018-06-03 RX ADMIN — OXYCODONE HYDROCHLORIDE AND ACETAMINOPHEN 2 TABLET: 5; 325 TABLET ORAL at 11:48

## 2018-06-03 RX ADMIN — OXYCODONE HYDROCHLORIDE AND ACETAMINOPHEN 2 TABLET: 5; 325 TABLET ORAL at 23:34

## 2018-06-03 RX ADMIN — IBUPROFEN 800 MG: 800 TABLET ORAL at 17:01

## 2018-06-03 RX ADMIN — IBUPROFEN 800 MG: 800 TABLET ORAL at 06:38

## 2018-06-03 RX ADMIN — OXYCODONE HYDROCHLORIDE AND ACETAMINOPHEN 2 TABLET: 5; 325 TABLET ORAL at 17:01

## 2018-06-03 RX ADMIN — SIMETHICONE CHEW TAB 80 MG 80 MG: 80 TABLET ORAL at 17:01

## 2018-06-03 RX ADMIN — Medication 1 TABLET: at 08:35

## 2018-06-03 RX ADMIN — OXYCODONE HYDROCHLORIDE AND ACETAMINOPHEN 2 TABLET: 5; 325 TABLET ORAL at 06:38

## 2018-06-03 RX ADMIN — OXYCODONE HYDROCHLORIDE AND ACETAMINOPHEN 2 TABLET: 5; 325 TABLET ORAL at 02:08

## 2018-06-03 RX ADMIN — SIMETHICONE CHEW TAB 80 MG 80 MG: 80 TABLET ORAL at 08:35

## 2018-06-03 NOTE — LACTATION NOTE
Pt states infant has started to latch some.  She continues to pump to assist with brining in milk.  Denies questions/concerns at this time.  Will call LC as needed.

## 2018-06-03 NOTE — PLAN OF CARE
Problem: Patient Care Overview  Goal: Plan of Care Review  Outcome: Ongoing (interventions implemented as appropriate)    Goal: Individualization and Mutuality  Outcome: Ongoing (interventions implemented as appropriate)    Goal: Discharge Needs Assessment  Outcome: Ongoing (interventions implemented as appropriate)    Goal: Interprofessional Rounds/Family Conf  Outcome: Ongoing (interventions implemented as appropriate)      Problem: Fall Risk,  (Adult,Obstetrics,Pediatric)  Goal: Identify Related Risk Factors and Signs and Symptoms  Outcome: Ongoing (interventions implemented as appropriate)    Goal: Absence of Maternal Fall  Outcome: Ongoing (interventions implemented as appropriate)    Goal: Absence of [place] Fall/Drop  Outcome: Ongoing (interventions implemented as appropriate)      Problem: Breastfeeding (Adult,Obstetrics,Pediatric)  Goal: Signs and Symptoms of Listed Potential Problems Will be Absent, Minimized or Managed (Breastfeeding)  Outcome: Ongoing (interventions implemented as appropriate)

## 2018-06-03 NOTE — PROGRESS NOTES
[facility]   PROGRESS NOTE    Patient Name: Starr Ramos  :  1980  MRN:  [id number]      Post-Op Day 2 S/P    Delivered a male infant.  Subjective     Patient reports:    Pain is well controlled. Voiding and ambulating without difficulty.    Tolerating po. Lochia normal.       The patient plans to breastfeed.         Objective       Vitals: Vital Signs Range for the last 24 hours  Temperature: Temp:  [97.5 °F (36.4 °C)-98.1 °F (36.7 °C)] 97.9 °F (36.6 °C)   Temp Source: Temp src: Oral   BP: BP: (107-118)/(68-71) 118/68   Pulse: Heart Rate:  [69-84] 77   Respirations: Resp:  [16-18] 16         Intake/Output Summary (Last 24 hours) at 18 0848  Last data filed at 18 0815   Gross per 24 hour   Intake             1080 ml   Output             3800 ml   Net            -2720 ml                                              Physical Exam     General Alert and awake, in NAD      CV Regular rate and rhythm      Lungs clear to auscultation bilaterally        Abdomen Soft, non-distended, fundus firm,  2 below umbilicus, appropriately tender      Incision  Dressing clean, dry and intact.      Extremities  tr edema, calves NT               LABS: Lab Results   Component Value Date    WBC 11.93 (H) 2018    HGB 10.8 (L) 2018    HCT 33.0 (L) 2018    MCV 96.5 2018     2018     Prenatal labs results reviewed:  Yes     Rh Status:  RH type   Date Value Ref Range Status   2018 Positive  Final                       Assessment/Plan   : 1. POD 2 S/P C/S: Hemodynamically stable.  Doing well.  Continue routine care. Circ not done yesterday- will do later today.    Active Problems:    Pregnancy    Delivered by  section          Nubia Delacruz MD  6/3/2018  8:48 AM

## 2018-06-03 NOTE — NURSING NOTE
Got pt up to bathroom so that she could try to void, pt unable to. Tried turning on the water and the peribottle. Pt still not able to void. She stated she would try again.

## 2018-06-04 PROBLEM — Z34.90 PREGNANCY: Status: RESOLVED | Noted: 2018-05-18 | Resolved: 2018-06-04

## 2018-06-04 RX ORDER — SODIUM CHLORIDE 0.9 % (FLUSH) 0.9 %
1-10 SYRINGE (ML) INJECTION AS NEEDED
Status: DISCONTINUED | OUTPATIENT
Start: 2018-06-04 | End: 2018-06-05 | Stop reason: HOSPADM

## 2018-06-04 RX ADMIN — Medication: at 18:47

## 2018-06-04 RX ADMIN — IBUPROFEN 800 MG: 800 TABLET ORAL at 18:46

## 2018-06-04 RX ADMIN — OXYCODONE HYDROCHLORIDE AND ACETAMINOPHEN 2 TABLET: 5; 325 TABLET ORAL at 08:46

## 2018-06-04 RX ADMIN — SIMETHICONE CHEW TAB 80 MG 80 MG: 80 TABLET ORAL at 23:12

## 2018-06-04 RX ADMIN — Medication 1 TABLET: at 08:45

## 2018-06-04 RX ADMIN — OXYCODONE HYDROCHLORIDE AND ACETAMINOPHEN 1 TABLET: 5; 325 TABLET ORAL at 23:12

## 2018-06-04 RX ADMIN — IBUPROFEN 800 MG: 800 TABLET ORAL at 04:46

## 2018-06-04 RX ADMIN — OXYCODONE HYDROCHLORIDE AND ACETAMINOPHEN 2 TABLET: 5; 325 TABLET ORAL at 04:46

## 2018-06-04 NOTE — PLAN OF CARE
Problem: Patient Care Overview  Goal: Plan of Care Review  Outcome: Ongoing (interventions implemented as appropriate)   18 1026   Coping/Psychosocial   Plan of Care Reviewed With patient   Plan of Care Review   Progress improving   OTHER   Outcome Summary assessment wnl; wants to stay today to work on breastfeeding; supplemented last night; pain meds prn     Goal: Individualization and Mutuality  Outcome: Ongoing (interventions implemented as appropriate)    Goal: Discharge Needs Assessment  Outcome: Ongoing (interventions implemented as appropriate)   18 1026   Discharge Needs Assessment   Readmission Within the Last 30 Days no previous admission in last 30 days   Concerns to be Addressed no discharge needs identified   Patient/Family Anticipates Transition to home with family   Patient/Family Anticipated Services at Transition none   Transportation Concerns car, none   Transportation Anticipated family or friend will provide   Anticipated Changes Related to Illness none   Equipment Needed After Discharge none   Disability   Equipment Currently Used at Home none       Problem: Postpartum ( Delivery) (Adult,Obstetrics,Pediatric)  Goal: Signs and Symptoms of Listed Potential Problems Will be Absent, Minimized or Managed (Postpartum)  Outcome: Ongoing (interventions implemented as appropriate)   18 1026   Goal/Outcome Evaluation   Problems Assessed (Postpartum ) all   Problems Present (Postpartum ) none       Problem: Breastfeeding (Adult,Obstetrics,Pediatric)  Goal: Signs and Symptoms of Listed Potential Problems Will be Absent, Minimized or Managed (Breastfeeding)  Outcome: Ongoing (interventions implemented as appropriate)   18 1026   Goal/Outcome Evaluation   Problems Assessed (Breastfeeding) all   Problems Present (Breastfeeding) none

## 2018-06-04 NOTE — PLAN OF CARE
Problem: Patient Care Overview  Goal: Plan of Care Review  Outcome: Ongoing (interventions implemented as appropriate)   06/04/18 5291   Coping/Psychosocial   Plan of Care Reviewed With patient;spouse   Plan of Care Review   Progress improving   OTHER   Outcome Summary doing well. pain controlled with po medication. breastfeeding infant. plan on dc today. will continue to monitor.      Goal: Individualization and Mutuality  Outcome: Ongoing (interventions implemented as appropriate)    Goal: Discharge Needs Assessment  Outcome: Ongoing (interventions implemented as appropriate)      Problem: Breastfeeding (Adult,Obstetrics,Pediatric)  Goal: Signs and Symptoms of Listed Potential Problems Will be Absent, Minimized or Managed (Breastfeeding)  Outcome: Ongoing (interventions implemented as appropriate)

## 2018-06-04 NOTE — PROGRESS NOTES
[facility]   PROGRESS NOTE    Patient Name: Starr Ramos  :  1980  MRN:  [id number]      Post-Op 3 S/P   Subjective     Patient reports:   Doing well. Pain well controlled. Tolerating regular diet and having flatus.    Lochia normal.       Objective       Vitals: Vital Signs Range for the last 24 hours  Temperature: Temp:  [97.4 °F (36.3 °C)-97.8 °F (36.6 °C)] 97.8 °F (36.6 °C)       BP: BP: (111-121)/(69-75) 121/74   Pulse: Heart Rate:  [67-73] 67   Respirations: Resp:  [16] 16                                         Physical Exam     General Alert       Abdomen Soft, non-distended, fundus firm, 1 below umbilicus, appropriately tender      Incision  Intact, no erythema or exudate      Extremities Calves NT bilaterally                Assessment/Plan  :   POD 3  -  Doing well.  Continue usual cares.     Wants to stay today to continue working on breastfeeding        Active Problems:    Delivered by  section               VERONICA Cheema  2018  9:10 AM

## 2018-06-04 NOTE — LACTATION NOTE
P1. AMA 37. Patient is confident using the HGP and is feeding back all EBM as well as supplementing with formula. Has personal pump for home use. Patients states she is doing much better today and will call for help as needed. Enc to come to hospitals to work on latch .

## 2018-06-05 VITALS
WEIGHT: 157 LBS | DIASTOLIC BLOOD PRESSURE: 71 MMHG | BODY MASS INDEX: 27.82 KG/M2 | RESPIRATION RATE: 18 BRPM | HEART RATE: 64 BPM | TEMPERATURE: 98.2 F | HEIGHT: 63 IN | OXYGEN SATURATION: 98 % | SYSTOLIC BLOOD PRESSURE: 113 MMHG

## 2018-06-05 RX ORDER — OXYCODONE HYDROCHLORIDE AND ACETAMINOPHEN 5; 325 MG/1; MG/1
2 TABLET ORAL EVERY 4 HOURS PRN
Qty: 30 TABLET | Refills: 0 | Status: SHIPPED | OUTPATIENT
Start: 2018-06-05 | End: 2018-06-08

## 2018-06-05 RX ORDER — IBUPROFEN 800 MG/1
800 TABLET ORAL EVERY 8 HOURS PRN
Qty: 30 TABLET | Refills: 3 | Status: ON HOLD | OUTPATIENT
Start: 2018-06-05 | End: 2019-12-05

## 2018-06-05 RX ADMIN — IBUPROFEN 800 MG: 800 TABLET ORAL at 09:08

## 2018-06-05 RX ADMIN — SIMETHICONE CHEW TAB 80 MG 80 MG: 80 TABLET ORAL at 09:07

## 2018-06-05 RX ADMIN — Medication 1 TABLET: at 09:08

## 2018-06-05 RX ADMIN — OXYCODONE HYDROCHLORIDE AND ACETAMINOPHEN 1 TABLET: 5; 325 TABLET ORAL at 09:08

## 2018-06-05 NOTE — DISCHARGE SUMMARY
Date of Discharge:  2018    Discharge Diagnosis: primary c/s    Presenting Problem/History of Present Illness  Pregnancy [Z34.90]       Hospital Course  Patient is a 37 y.o. female presented for scheduled primary c/s d/t breech presentation.  Delivered viable male infant per Dr. Martienz.  No pp complications.  Ready for d/c.      Procedures Performed  Procedure(s):   SECTION PRIMARY         Condition on Discharge:  Post-Op Day 4 S/P   Subjective     Patient reports:    Doing well - ready for discharge. Pain well controlled. Tolerating po and   having flatus. Voiding and ambulating without difficulty. Lochia normal.     Vital Signs  Temp:  [97.6 °F (36.4 °C)-98.2 °F (36.8 °C)] 98.2 °F (36.8 °C)  Heart Rate:  [64-75] 64  Resp:  [18] 18  BP: (113-125)/(71-73) 113/71    Physical Exam    Gen Alert and awake   Abdomen Soft, ND,  Fundus firm with minimal tenderness   Incision  Intact, without erythema or exudate   Extremities Calves NT bilaterally     Assessment/Plan ]   Assessment:  POD 4  -  Doing well. Stable for discharge.     Plan:    Instructions reviewed       Consults:   Consults     No orders found from 5/3/2018 to 2018.          Discharge Disposition  Home or Self Care    Discharge Medications   Starr Ramos   Home Medication Instructions JESSE:[id number]    Printed on:18 0931   Medication Information                      ibuprofen (ADVIL,MOTRIN) 800 MG tablet  Take 1 tablet by mouth Every 8 (Eight) Hours As Needed for Mild Pain .             oxyCODONE-acetaminophen (PERCOCET) 5-325 MG per tablet  Take 2 tablets by mouth Every 4 (Four) Hours As Needed for Severe Pain  for up to 3 days.             Prenatal Vit-Fe Fumarate-FA (PRENATAL, CLASSIC, VITAMIN) 28-0.8 MG tablet tablet  Take 1 tablet by mouth Daily.                 The patient has been prescribed a controlled substance.  She has been counseled on the risks associated with using the medication.   The addictive potential of  this medication and alternatives were discussed carefully with this patient and she demonstrated understanding.  A SUZIE report has been obtained and reviewed.    Activity at Discharge:     Follow-up Appointments  No future appointments.  Additional Instructions for the Follow-ups that You Need to Schedule     Call MD With Problems / Concerns    As directed      Instructions: call for fever, increased vaginal bleeding or pain, any other concerns    Order Comments:  Instructions: call for fever, increased vaginal bleeding or pain, any other concerns                Test Results Pending at Discharge       VERONICA Cheema  06/05/18  9:31 AM

## 2018-06-05 NOTE — LACTATION NOTE
Lactation Consult Note    Evaluation Completed: 2018 11:19 AM  Patient Name: Starr Ramos  :  1980  MRN:  [id number]     REFERRAL  INFORMATION:                          Date of Referral: 18   Person Making Referral: nurse  Maternal Reason for Referral: engorgement       DELIVERY HISTORY:          Skin to skin initiation date/time: 2018  11:30 AM   Skin to skin end date/time:              MATERNAL ASSESSMENT:  Breast Size Issue: none (18 1058 : Cori Mcelroy RN)  Breast Shape: round (18 1058 : Cori Mcelroy RN)  Breast Density: engorged (18 1058 : Cori Mcelroy RN)  Areola: elastic, other (see comments) (responds to RPS) (18 1058 : Cori Mcelroy RN)  Nipples: graspable (18 1058 : Cori Mcelroy RN)                INFANT ASSESSMENT:  Information for the patient's :  Ramos Ramos [[id number]]   No past medical history on file.    Feeding Readiness Cues: hand to mouth movements, eager, energy for feeding (18 1100 : Cori Mcelroy RN)   Feeding Method: breastfeeding (18 1100 : Cori Mcelroy RN)   Feeding Tolerance/Success: coordinated suck, coordinated swallow, alert for feeding, eager (18 1100 : Cori Mcelroy RN)                   Feeding Interventions: feeding cues monitored, latch assistance provided (18 1100 : Cori Mcelroy RN)   Nutrition Interventions: lactation consult initiated (18 1100 : Cori Mcelroy RN)   Additional Documentation: LATCH Score (Group) (18 1100 : Cori Mcelroy RN)           Breastfeeding: breastfeeding, right side only (18 1100 : Cori Mcelroy RN)   Infant Positioning: clutch/football, cross-cradle (18 1100 : Cori Mcelroy RN)           Effective Latch During Feeding: yes (18 1100 : Cori Mcelroy RN)   Suck/Swallow Coordination: present (18 1100 : Cori Mcelroy RN)   Signs of Milk  Transfer: infant jaw motion present, suck/swallow ratio (18 1100 : Cori Mcelroy RN)       Latch: 2-->grasps breast, tongue down, lips flanged, rhythmic sucking (18 1100 : Cori Mcelroy RN)   Audible Swallowin-->spontaneous and intermittent (24 hrs old) (18 1100 : Cori Mcelroy RN)   Type of Nipple: 2-->everted (after stimulation) (18 1100 : Cori Mcelroy RN)   Comfort (Breast/Nipple): 2-->soft/nontender (18 1100 : Cori Mcelroy RN)   Hold (Positioning): 1-->minimal assist, teach one side, mother does other, staff holds (18 1100 : Cori Mcelroy RN)   Score: 9 (18 1100 : Cori Mcelroy RN)     Infant-Driven Feeding Scales - Readiness: Alert or fussy prior to care. Rooting and/or hands to mouth behavior. Good tone. (18 1100 : Cori Mcelroy RN)                 MATERNAL INFANT FEEDING:            Signs of Milk Transfer: infant jaw motion present, suck/swallow ratio (18 1058 : Cori Mcelroy RN)  Pain with Feeding: no (18 1058 : Cori Mcelroy RN)        Comfort Measures Before/During Feeding: infant position adjusted, latch adjusted (18 1058 : Cori Mcelroy RN)                                              EQUIPMENT TYPE:  Breast Pump Type: double electric, hospital grade (18 1058 : Cori Mcelroy RN)                              BREAST PUMPING:          LACTATION REFERRALS:

## 2018-06-05 NOTE — LACTATION NOTE
This note was copied from a baby's chart.  P1. Patient still having c/s pain and finding it difficult to sit upright, thereby making positioning baby Chess dificult. Add to that, his little breech legs and head and it can be  Quite the challenge to get a good position. With using RPS to soften and elongate nipple we were able to get a deep nutritive latch on right breast . Teaching needed for mom to replicate latch . Breasts have filled and are becoming engorged. Discussed comfort measures and importance of draining breasts frequently. Starr had pumped but had a low setting on pump and only about 10cc was obtained. Suggested higher vacuum level on pump and lubrication of pump flange. Worked on getting a position of comfort and efficiency with Chess. Really needs follow up in [facility].    Lactation Consult Note    Evaluation Completed: 2018 11:15 AM  Patient Name: Ramos Ramos  :  2018  MRN:  [id number]     REFERRAL  INFORMATION:                          Date of Referral: 18   Person Making Referral: patient  Maternal Reason for Referral: breastfeeding currently, maternal age advanced  Infant Reason for Referral: no latch within 24 hours    DELIVERY HISTORY:  This patient has no babies on file.  This patient has no babies on file.  Skin to skin initiation date/time: 2018 11:30 AM  Skin to skin end date/time:      This patient has no babies on file.    MATERNAL ASSESSMENT:                                                                                               Lactation Consult Note    Evaluation Completed: 2018 11:18 AM  Patient Name: Ramos Ramos  :  2018  MRN:  [id number]     REFERRAL  INFORMATION:                          Date of Referral: 18   Person Making Referral: patient  Maternal Reason for Referral: breastfeeding currently, maternal age advanced  Infant Reason for Referral: no latch within 24 hours    DELIVERY HISTORY:  This patient has no babies on file.  This  patient has no babies on file.  Skin to skin initiation date/time: 6/1/2018 11:30 AM  Skin to skin end date/time:      This patient has no babies on file.    MATERNAL ASSESSMENT:                               INFANT ASSESSMENT:  This patient has no babies on file.  This patient has no babies on file.  This patient has no babies on file.  This patient has no babies on file.  This patient has no babies on file.  This patient has no babies on file.  This patient has no babies on file.  This patient has no babies on file.  This patient has no babies on file.  This patient has no babies on file.  This patient has no babies on file.  This patient has no babies on file.  This patient has no babies on file.  This patient has no babies on file.  This patient has no babies on file.  This patient has no babies on file.  This patient has no babies on file.  This patient has no babies on file.  This patient has no babies on file.  This patient has no babies on file.      This patient has no babies on file.  This patient has no babies on file.  This patient has no babies on file.  This patient has no babies on file.  This patient has no babies on file.  This patient has no babies on file.    This patient has no babies on file.  This patient has no babies on file.  This patient has no babies on file.        MATERNAL INFANT FEEDING:        Infant Positioning: clutch/football, cross-cradle (06/05/18 1100 : Cori Mcelroy RN)   Signs of Milk Transfer: infant jaw motion present, suck/swallow ratio (06/05/18 1100 : Cori Mcelroy RN)                                                          EQUIPMENT TYPE:                                 BREAST PUMPING:          LACTATION REFERRALS:                                                                          BREAST PUMPING:          LACTATION REFERRALS:

## 2018-06-21 ENCOUNTER — HOSPITAL ENCOUNTER (OUTPATIENT)
Dept: LACTATION | Facility: HOSPITAL | Age: 38
Discharge: HOME | End: 2018-06-21

## 2018-06-21 NOTE — LACTATION NOTE
P1, AMA with hypothyroidism that is untreated. Breasts are drained 5-6 x in 24 hours and Ches gets formula as well. Both Nipples are bruised from baby's latching style. Mom has trouble getting Ches latched to the right breast but he did better on the right than on the left. He only transferred 2cc and then took 2oz  Of formula.     Birthweight 7 lbs 4 oz  Lowest       6 lbs 7oz  Today  Pre-feed   8lbs 1.3     PLAN B: Starr will pump q 2-3 hours and Ches will be bottle fed EBM and Formula. When breasts are full will try to nurse and pump after. For now Baby does not transfer enough milk to warrant the energy involved from Mom or baby.  [facility]ing on Monday or [facility] to [facility] for follow[facility]up    Lactation Consult Note    Evaluation Completed: 2018 12:40 PM  Patient Name: Starr Ramos  :  1980  MRN:  [id number]     REFERRAL  INFORMATION:                          Date of Referral: 18   Person Making Referral: nurse  Maternal Reason for Referral: breastfeeding currently, latch painful       DELIVERY HISTORY:          Skin to skin initiation date/time: 2018  11:30 AM   Skin to skin end date/time:              MATERNAL ASSESSMENT:  Breast Size Issue: none (18 1100 : Cori Mcelroy RN)  Breast Shape: round (18 1100 : Cori Mcelroy RN)  Breast Density: full, soft (18 1100 : Cori Mcelroy RN)  Areola: elastic (18 1100 : Cori Mcelroy RN)  Nipples: graspable, everted (18 1100 : Cori Mcelroy RN)     Left Nipple Symptoms: painful, bruised (18 1100 : Cori Mcelroy RN)  Right Nipple Symptoms: bruised, painful (18 1100 : Cori Mcelroy RN)       INFANT ASSESSMENT:  Information for the patient's :  Boris Ruelas [[id number]]   No past medical history on file.                                                                                                                                MATERNAL INFANT FEEDING:  Maternal  Preparation: breast care (06/21/18 1100 : Cori Mcelroy RN)  Maternal Emotional State: assist needed (06/21/18 1100 : Cori Mcelroy RN)  Infant Positioning: cross-cradle, clutch/football (06/21/18 1100 : Cori Mcelroy RN)   Signs of Milk Transfer: audible swallow, breasts soften with feeding, infant jaw motion present, suck/swallow ratio (06/21/18 1100 : Cori Mcelroy RN)  Pain with Feeding: yes (06/21/18 1100 : Cori Mcelroy RN)  Pain Location: nipples, bilateral (06/21/18 1100 : Cori Mcelroy RN)  Pain Description: aching, deep, sharp (06/21/18 1100 : Cori Mcelroy RN)  Comfort Measures Before/During Feeding: infant position adjusted, latch adjusted (06/21/18 1100 : Cori Mcelroy RN)  Milk Ejection Reflex: present (06/21/18 1100 : Cori Mcelroy RN)  Comfort Measures Following Feeding: air-drying encouraged, expressed milk applied, soap use discouraged (06/21/18 1100 : Cori Mcelroy RN)        Latch Assistance: yes (06/21/18 1100 : Cori Mcelroy RN)                               EQUIPMENT TYPE:  Breast Pump Type: double electric, personal (06/21/18 1100 : Cori Mcelroy RN)  Breast Pump Flange Type: hard (06/21/18 1100 : Cori Mcelroy RN)  Breast Pump Flange Size: 24 mm (06/21/18 1100 : Cori Mcelroy RN)                        BREAST PUMPING:  Breast Pumping Interventions: post-feed pumping encouraged (06/21/18 1100 : Cori Mcelroy RN)       LACTATION REFERRALS:  Lactation Referrals: outpatient lactation program (06/21/18 1100 : Cori Mcelroy RN)

## 2018-06-28 ENCOUNTER — HOSPITAL ENCOUNTER (OUTPATIENT)
Dept: LACTATION | Facility: HOSPITAL | Age: 38
Discharge: HOME | End: 2018-06-28

## 2018-06-28 NOTE — LACTATION NOTE
Lactation Consult Note    Evaluation Completed: 2018 2:16 PM  Patient Name: Starr Ramos  :  1980  MRN:  [id number]     REFERRAL  INFORMATION:                          Date of Referral: 18   Person Making Referral: patient  Maternal Reason for Referral: maternal age advanced, breastfeeding currently, milk supply inadequate history       DELIVERY HISTORY:          Skin to skin initiation date/time: 2018  11:30 AM   Skin to skin end date/time:              MATERNAL ASSESSMENT:  Breast Size Issue: none (18 1312 : Cori Mcelroy RN)  Breast Shape: round (182 : Cori Mcelroy RN)  Breast Density: full (18 : Cori Mcelroy RN)  Areola: elastic (18 : Cori Mcelroy RN)  Nipples: everted (18 : Cori Mcelroy RN)                INFANT ASSESSMENT:  Information for the patient's :  Boris Ruelas [[id number]]   No past medical history on file.                                                                                                                                MATERNAL INFANT FEEDING:  Maternal Preparation: breast care (18 131 : Cori Mcelroy RN)  Maternal Emotional State: relaxed (18 : Cori Mcelroy RN)  Infant Positioning: cross-cradle (18 : Cori Mcelroy RN)   Signs of Milk Transfer: audible swallow, infant jaw motion present, suck/swallow ratio (18 : Cori Mcelroy RN)  Pain with Feeding: no (182 : Cori Mcelroy RN)           Milk Ejection Reflex: present (18 : Cori Mcelroy RN)  Comfort Measures Following Feeding: air-drying encouraged, expressed milk applied (18 : Cori Mcelroy RN)                                        EQUIPMENT TYPE:  Breast Pump Type: double electric, personal (18 : Cori Mcelroy RN)  Breast Pump Flange Type: hard (182 : Cori Mcelroy  RN)  Breast Pump Flange Size: 27 mm (06/28/18 1312 : Cori Mcelroy RN)                        BREAST PUMPING:  Breast Pumping Interventions: post-feed pumping encouraged (06/28/18 1312 : Cori Mcelroy RN)  Breast Pumping: double electric breast pump utilized, bilateral breasts pumped until soft (06/28/18 1312 : Cori Mcelroy RN)    LACTATION REFERRALS:

## 2018-06-28 NOTE — LACTATION NOTE
P1. Used 24mm nipple   .s hield with better transfer and mom was comfortable . Will pump 3 x day and use shield. Baby will be fed supplements by Dad.  Follow-up next week.

## 2019-12-05 ENCOUNTER — ANESTHESIA (OUTPATIENT)
Dept: PERIOP | Facility: HOSPITAL | Age: 39
End: 2019-12-05

## 2019-12-05 ENCOUNTER — ANESTHESIA EVENT (OUTPATIENT)
Dept: PERIOP | Facility: HOSPITAL | Age: 39
End: 2019-12-05

## 2019-12-05 ENCOUNTER — HOSPITAL ENCOUNTER (OUTPATIENT)
Facility: HOSPITAL | Age: 39
Setting detail: HOSPITAL OUTPATIENT SURGERY
Discharge: HOME | End: 2019-12-05
Attending: OBSTETRICS & GYNECOLOGY | Admitting: OBSTETRICS & GYNECOLOGY

## 2019-12-05 VITALS
HEART RATE: 68 BPM | WEIGHT: 144.56 LBS | TEMPERATURE: 97.5 F | OXYGEN SATURATION: 98 % | DIASTOLIC BLOOD PRESSURE: 84 MMHG | HEIGHT: 63 IN | RESPIRATION RATE: 16 BRPM | SYSTOLIC BLOOD PRESSURE: 120 MMHG | BODY MASS INDEX: 25.61 KG/M2

## 2019-12-05 DIAGNOSIS — O02.1 MISSED ABORTION: ICD-10-CM

## 2019-12-05 LAB
DEPRECATED RDW RBC AUTO: 41.4 FL (ref 37–54)
ERYTHROCYTE [DISTWIDTH] IN BLOOD BY AUTOMATED COUNT: 12.3 % (ref 12.3–15.4)
HCT VFR BLD AUTO: 37.5 % (ref 34–46.6)
HGB BLD-MCNC: 12 G/DL (ref 12–15.9)
MCH RBC QN AUTO: 29.3 PG (ref 26.6–33)
MCHC RBC AUTO-ENTMCNC: 32 G/DL (ref 31.5–35.7)
MCV RBC AUTO: 91.5 FL (ref 79–97)
PLATELET # BLD AUTO: 156 10*3/MM3 (ref 140–450)
PMV BLD AUTO: 10.1 FL (ref 6–12)
RBC # BLD AUTO: 4.1 10*6/MM3 (ref 3.77–5.28)
WBC NRBC COR # BLD: 4.7 10*3/MM3 (ref 3.4–10.8)

## 2019-12-05 PROCEDURE — 25010000002 FENTANYL CITRATE (PF) 100 MCG/2ML SOLUTION: Performed by: NURSE ANESTHETIST, CERTIFIED REGISTERED

## 2019-12-05 PROCEDURE — 25010000002 PROPOFOL 10 MG/ML EMULSION: Performed by: NURSE ANESTHETIST, CERTIFIED REGISTERED

## 2019-12-05 PROCEDURE — 88305 TISSUE EXAM BY PATHOLOGIST: CPT | Performed by: OBSTETRICS & GYNECOLOGY

## 2019-12-05 PROCEDURE — 25010000002 ROPIVACAINE PER 1 MG: Performed by: OBSTETRICS & GYNECOLOGY

## 2019-12-05 PROCEDURE — 85027 COMPLETE CBC AUTOMATED: CPT | Performed by: OBSTETRICS & GYNECOLOGY

## 2019-12-05 PROCEDURE — 25010000002 KETOROLAC TROMETHAMINE PER 15 MG: Performed by: NURSE ANESTHETIST, CERTIFIED REGISTERED

## 2019-12-05 RX ORDER — MIDAZOLAM HYDROCHLORIDE 1 MG/ML
1 INJECTION INTRAMUSCULAR; INTRAVENOUS
Status: DISCONTINUED | OUTPATIENT
Start: 2019-12-05 | End: 2019-12-05 | Stop reason: HOSPADM

## 2019-12-05 RX ORDER — SODIUM CHLORIDE 0.9 % (FLUSH) 0.9 %
3-10 SYRINGE (ML) INJECTION AS NEEDED
Status: DISCONTINUED | OUTPATIENT
Start: 2019-12-05 | End: 2019-12-05 | Stop reason: HOSPADM

## 2019-12-05 RX ORDER — SODIUM CHLORIDE 0.9 % (FLUSH) 0.9 %
3 SYRINGE (ML) INJECTION EVERY 12 HOURS SCHEDULED
Status: DISCONTINUED | OUTPATIENT
Start: 2019-12-05 | End: 2019-12-05 | Stop reason: HOSPADM

## 2019-12-05 RX ORDER — HYDROCODONE BITARTRATE AND ACETAMINOPHEN 7.5; 325 MG/1; MG/1
1 TABLET ORAL ONCE AS NEEDED
Status: DISCONTINUED | OUTPATIENT
Start: 2019-12-05 | End: 2019-12-05 | Stop reason: HOSPADM

## 2019-12-05 RX ORDER — OXYCODONE HYDROCHLORIDE AND ACETAMINOPHEN 5; 325 MG/1; MG/1
1 TABLET ORAL EVERY 6 HOURS PRN
Qty: 6 TABLET | Refills: 0 | Status: ON HOLD | OUTPATIENT
Start: 2019-12-05 | End: 2021-01-18 | Stop reason: SDUPTHER

## 2019-12-05 RX ORDER — IBUPROFEN 200 MG
200 TABLET ORAL EVERY 6 HOURS PRN
Status: ON HOLD | COMMUNITY
End: 2019-12-05 | Stop reason: SDUPTHER

## 2019-12-05 RX ORDER — FAMOTIDINE 10 MG/ML
20 INJECTION, SOLUTION INTRAVENOUS ONCE
Status: COMPLETED | OUTPATIENT
Start: 2019-12-05 | End: 2019-12-05

## 2019-12-05 RX ORDER — IBUPROFEN 200 MG
600 TABLET ORAL EVERY 6 HOURS PRN
Qty: 30 TABLET | Refills: 0 | Status: SHIPPED | OUTPATIENT
Start: 2019-12-05 | End: 2021-01-18 | Stop reason: HOSPADM

## 2019-12-05 RX ORDER — EPHEDRINE SULFATE 50 MG/ML
5 INJECTION, SOLUTION INTRAVENOUS ONCE AS NEEDED
Status: DISCONTINUED | OUTPATIENT
Start: 2019-12-05 | End: 2019-12-05 | Stop reason: HOSPADM

## 2019-12-05 RX ORDER — DOXYCYCLINE 100 MG/1
200 CAPSULE ORAL ONCE
Status: COMPLETED | OUTPATIENT
Start: 2019-12-05 | End: 2019-12-05

## 2019-12-05 RX ORDER — HYDROMORPHONE HYDROCHLORIDE 1 MG/ML
0.25 INJECTION, SOLUTION INTRAMUSCULAR; INTRAVENOUS; SUBCUTANEOUS
Status: DISCONTINUED | OUTPATIENT
Start: 2019-12-05 | End: 2019-12-05 | Stop reason: HOSPADM

## 2019-12-05 RX ORDER — FENTANYL CITRATE 50 UG/ML
25 INJECTION, SOLUTION INTRAMUSCULAR; INTRAVENOUS
Status: DISCONTINUED | OUTPATIENT
Start: 2019-12-05 | End: 2019-12-05 | Stop reason: HOSPADM

## 2019-12-05 RX ORDER — FLUMAZENIL 0.1 MG/ML
0.2 INJECTION INTRAVENOUS AS NEEDED
Status: DISCONTINUED | OUTPATIENT
Start: 2019-12-05 | End: 2019-12-05 | Stop reason: HOSPADM

## 2019-12-05 RX ORDER — DIPHENHYDRAMINE HYDROCHLORIDE 50 MG/ML
12.5 INJECTION INTRAMUSCULAR; INTRAVENOUS
Status: DISCONTINUED | OUTPATIENT
Start: 2019-12-05 | End: 2019-12-05 | Stop reason: HOSPADM

## 2019-12-05 RX ORDER — ONDANSETRON 2 MG/ML
4 INJECTION INTRAMUSCULAR; INTRAVENOUS ONCE AS NEEDED
Status: DISCONTINUED | OUTPATIENT
Start: 2019-12-05 | End: 2019-12-05 | Stop reason: HOSPADM

## 2019-12-05 RX ORDER — FENTANYL CITRATE 50 UG/ML
50 INJECTION, SOLUTION INTRAMUSCULAR; INTRAVENOUS
Status: DISCONTINUED | OUTPATIENT
Start: 2019-12-05 | End: 2019-12-05 | Stop reason: HOSPADM

## 2019-12-05 RX ORDER — LIDOCAINE HYDROCHLORIDE 10 MG/ML
0.5 INJECTION, SOLUTION EPIDURAL; INFILTRATION; INTRACAUDAL; PERINEURAL ONCE AS NEEDED
Status: DISCONTINUED | OUTPATIENT
Start: 2019-12-05 | End: 2019-12-05 | Stop reason: HOSPADM

## 2019-12-05 RX ORDER — OXYCODONE AND ACETAMINOPHEN 7.5; 325 MG/1; MG/1
1 TABLET ORAL ONCE AS NEEDED
Status: DISCONTINUED | OUTPATIENT
Start: 2019-12-05 | End: 2019-12-05 | Stop reason: HOSPADM

## 2019-12-05 RX ORDER — PROPOFOL 10 MG/ML
VIAL (ML) INTRAVENOUS CONTINUOUS PRN
Status: DISCONTINUED | OUTPATIENT
Start: 2019-12-05 | End: 2019-12-05 | Stop reason: SURG

## 2019-12-05 RX ORDER — KETOROLAC TROMETHAMINE 30 MG/ML
INJECTION, SOLUTION INTRAMUSCULAR; INTRAVENOUS AS NEEDED
Status: DISCONTINUED | OUTPATIENT
Start: 2019-12-05 | End: 2019-12-05 | Stop reason: SURG

## 2019-12-05 RX ORDER — MAGNESIUM HYDROXIDE 1200 MG/15ML
LIQUID ORAL AS NEEDED
Status: DISCONTINUED | OUTPATIENT
Start: 2019-12-05 | End: 2019-12-05 | Stop reason: HOSPADM

## 2019-12-05 RX ORDER — HYDRALAZINE HYDROCHLORIDE 20 MG/ML
5 INJECTION INTRAMUSCULAR; INTRAVENOUS
Status: DISCONTINUED | OUTPATIENT
Start: 2019-12-05 | End: 2019-12-05 | Stop reason: HOSPADM

## 2019-12-05 RX ORDER — ROPIVACAINE HYDROCHLORIDE 5 MG/ML
INJECTION, SOLUTION EPIDURAL; INFILTRATION; PERINEURAL AS NEEDED
Status: DISCONTINUED | OUTPATIENT
Start: 2019-12-05 | End: 2019-12-05 | Stop reason: HOSPADM

## 2019-12-05 RX ORDER — DIPHENHYDRAMINE HCL 25 MG
25 CAPSULE ORAL
Status: DISCONTINUED | OUTPATIENT
Start: 2019-12-05 | End: 2019-12-05 | Stop reason: HOSPADM

## 2019-12-05 RX ORDER — MIDAZOLAM HYDROCHLORIDE 1 MG/ML
2 INJECTION INTRAMUSCULAR; INTRAVENOUS
Status: DISCONTINUED | OUTPATIENT
Start: 2019-12-05 | End: 2019-12-05 | Stop reason: HOSPADM

## 2019-12-05 RX ORDER — PROMETHAZINE HYDROCHLORIDE 25 MG/1
25 SUPPOSITORY RECTAL ONCE AS NEEDED
Status: DISCONTINUED | OUTPATIENT
Start: 2019-12-05 | End: 2019-12-05 | Stop reason: HOSPADM

## 2019-12-05 RX ORDER — SODIUM CHLORIDE, SODIUM LACTATE, POTASSIUM CHLORIDE, CALCIUM CHLORIDE 600; 310; 30; 20 MG/100ML; MG/100ML; MG/100ML; MG/100ML
9 INJECTION, SOLUTION INTRAVENOUS CONTINUOUS
Status: DISCONTINUED | OUTPATIENT
Start: 2019-12-05 | End: 2019-12-05 | Stop reason: HOSPADM

## 2019-12-05 RX ORDER — NALOXONE HCL 0.4 MG/ML
0.2 VIAL (ML) INJECTION AS NEEDED
Status: DISCONTINUED | OUTPATIENT
Start: 2019-12-05 | End: 2019-12-05 | Stop reason: HOSPADM

## 2019-12-05 RX ORDER — FENTANYL CITRATE 50 UG/ML
INJECTION, SOLUTION INTRAMUSCULAR; INTRAVENOUS AS NEEDED
Status: DISCONTINUED | OUTPATIENT
Start: 2019-12-05 | End: 2019-12-05 | Stop reason: SURG

## 2019-12-05 RX ORDER — PROMETHAZINE HYDROCHLORIDE 25 MG/ML
12.5 INJECTION, SOLUTION INTRAMUSCULAR; INTRAVENOUS ONCE AS NEEDED
Status: DISCONTINUED | OUTPATIENT
Start: 2019-12-05 | End: 2019-12-05 | Stop reason: HOSPADM

## 2019-12-05 RX ORDER — ACETAMINOPHEN 325 MG/1
650 TABLET ORAL ONCE AS NEEDED
Status: DISCONTINUED | OUTPATIENT
Start: 2019-12-05 | End: 2019-12-05 | Stop reason: HOSPADM

## 2019-12-05 RX ORDER — PROPOFOL 10 MG/ML
VIAL (ML) INTRAVENOUS AS NEEDED
Status: DISCONTINUED | OUTPATIENT
Start: 2019-12-05 | End: 2019-12-05 | Stop reason: SURG

## 2019-12-05 RX ORDER — LIDOCAINE HYDROCHLORIDE 20 MG/ML
INJECTION, SOLUTION INFILTRATION; PERINEURAL AS NEEDED
Status: DISCONTINUED | OUTPATIENT
Start: 2019-12-05 | End: 2019-12-05 | Stop reason: SURG

## 2019-12-05 RX ORDER — PROMETHAZINE HYDROCHLORIDE 25 MG/ML
6.25 INJECTION, SOLUTION INTRAMUSCULAR; INTRAVENOUS
Status: DISCONTINUED | OUTPATIENT
Start: 2019-12-05 | End: 2019-12-05 | Stop reason: HOSPADM

## 2019-12-05 RX ORDER — PROMETHAZINE HYDROCHLORIDE 25 MG/1
25 TABLET ORAL ONCE AS NEEDED
Status: DISCONTINUED | OUTPATIENT
Start: 2019-12-05 | End: 2019-12-05 | Stop reason: HOSPADM

## 2019-12-05 RX ADMIN — SODIUM CHLORIDE, POTASSIUM CHLORIDE, SODIUM LACTATE AND CALCIUM CHLORIDE 9 ML/HR: 600; 310; 30; 20 INJECTION, SOLUTION INTRAVENOUS at 06:46

## 2019-12-05 RX ADMIN — DOXYCYCLINE 200 MG: 100 CAPSULE ORAL at 07:07

## 2019-12-05 RX ADMIN — PROPOFOL 100 MG: 10 INJECTION, EMULSION INTRAVENOUS at 08:06

## 2019-12-05 RX ADMIN — KETOROLAC TROMETHAMINE 30 MG: 30 INJECTION, SOLUTION INTRAMUSCULAR; INTRAVENOUS at 08:14

## 2019-12-05 RX ADMIN — FAMOTIDINE 20 MG: 10 INJECTION INTRAVENOUS at 07:29

## 2019-12-05 RX ADMIN — FENTANYL CITRATE 50 MCG: 50 INJECTION INTRAMUSCULAR; INTRAVENOUS at 08:09

## 2019-12-05 RX ADMIN — PROPOFOL 140 MCG/KG/MIN: 10 INJECTION, EMULSION INTRAVENOUS at 08:06

## 2019-12-05 RX ADMIN — LIDOCAINE HYDROCHLORIDE 80 MG: 20 INJECTION, SOLUTION INFILTRATION; PERINEURAL at 08:05

## 2019-12-05 RX ADMIN — FENTANYL CITRATE 50 MCG: 50 INJECTION INTRAMUSCULAR; INTRAVENOUS at 08:03

## 2019-12-05 NOTE — OP NOTE
Subjective     Patient Name: Starr Ramos  :  1980  MRN:  [id number]      Date of Service:  19    Surgeon: Ted Martinez MD     Pre-Operative Diagnosis:   7 week Missed     Post-Operative Diagnosis:    7 week Missed     Procedure:  Procedure(s):  DILATATION AND CURETTAGE WITH SUCTION    Anesthesia: Type: Monitored Anesthesia Care       EBL: 20 cc    Specimen removed:  Products of Conception     Findings: 8 week sized uterus with moderate amounts of products of conception     Indication: 40 yo  @ 12 weeks by LMP - had U/S over 1 week ago which showed 7 wk missed  after previous U/S few weeks earlier which showed viability. Desired expectant management and had some bleeding with clots last week. Had hoped passed spontaneously but U/S yesterday still showed evidence of IUP.     Procedure:   Patient was taken operating room where IV sedation was obtained without difficulty.  She was then placed in the dorsal lithotomy position in stirrups.  A speculum was placed within the vagina and the cervix was cleansed with Betadine.  A paracervical block was then performed injecting a 1-1 mixture of 1% lidocaine and 0.5% Naropin at the 4 and 7:00 positions of the cervical vaginal junction.  10 cc was injected at each site.  The speculum was then removed and the patient was then prepped in the more standard fashion.  Speculum was replaced within the vagina and the anterior lip of the cervix grasped with a long Allis.  The cervix was then gently dilated in order to allow passage of a 8 mm curved suction curette.  Suction was tested and noted to be at 60 mmHg.  The curette was then advanced through the cervix up to the uterine fundus and suction was applied with return of moderate amount of products of conception.  Several additional passages with the curette were performed until minimal tissue was noted.  A #1  Sharp curette was then used to perform a sharp curettage and a gritty  texture was noted within the cavity and no additional tissue was obtained.  A final passage with the suction curette was performed with no additional tissue noted. The  Allis and speculum was then removed.  Sponge and needle counts are correct ×2.  She was awakened from anesthesia and taken to face to recovery in stable condition.    Patient is RH POS.      Ted Martinez MD   12/5/2019   8:27 AM

## 2019-12-05 NOTE — ANESTHESIA PREPROCEDURE EVALUATION
Anesthesia Evaluation     Patient summary reviewed and Nursing notes reviewed                Airway   Mallampati: II  TM distance: >3 FB  Neck ROM: full  No difficulty expected  Dental - normal exam     Pulmonary - normal exam   Cardiovascular - normal exam    (+) hypertension well controlled, valvular problems/murmurs MVP,     ROS comment: Hx HTN, on no meds (resolved with diet and exercise)    Neuro/Psych  (+) headaches,     GI/Hepatic/Renal/Endo    (+)   thyroid problem hypothyroidism    Musculoskeletal     Abdominal  - normal exam   Substance History      OB/GYN          Other                        Anesthesia Plan    ASA 2     MAC   (Propofol drip (surgeon to do paracervical block))  intravenous induction     Anesthetic plan, all risks, benefits, and alternatives have been provided, discussed and informed consent has been obtained with: patient.

## 2019-12-05 NOTE — ANESTHESIA POSTPROCEDURE EVALUATION
Patient: Starr Ramos    Procedure Summary     Date:  12/05/19 Room / Location:  [facility] OR  / [facility] MAIN OR    Anesthesia Start:  0758 Anesthesia Stop:  0832    Procedure:  DILATATION AND CURETTAGE WITH SUCTION (N/A Vagina) Diagnosis:      Surgeon:  Ted Martinez MD Provider:  Uche York MD    Anesthesia Type:  MAC ASA Status:  2          Anesthesia Type: MAC  Last vitals  BP   120/84 (12/05/19 0900)   Temp   36.4 °C (97.5 °F) (12/05/19 0831)   Pulse   68 (12/05/19 0900)   Resp   16 (12/05/19 0900)     SpO2   98 % (12/05/19 0900)     Post Anesthesia Care and Evaluation    Patient location during evaluation: PACU  Patient participation: complete - patient participated  Level of consciousness: awake and alert  Pain management: adequate  Airway patency: patent  Anesthetic complications: No anesthetic complications    Cardiovascular status: acceptable  Respiratory status: acceptable  Hydration status: acceptable    Comments: --------------------            12/05/19 0900     --------------------   BP:       120/84     Pulse:      68       Resp:       16       Temp:                SpO2:      98%      --------------------

## 2019-12-06 LAB
CYTO UR: NORMAL
LAB AP CASE REPORT: NORMAL
PATH REPORT.FINAL DX SPEC: NORMAL
PATH REPORT.GROSS SPEC: NORMAL

## 2020-06-22 LAB
EXTERNAL HEPATITIS B SURFACE ANTIGEN: NEGATIVE
EXTERNAL HEPATITIS C AB: NEGATIVE
EXTERNAL RUBELLA QUALITATIVE: NORMAL
EXTERNAL SYPHILIS RPR SCREEN: NORMAL
HIV1 P24 AG SERPL QL IA: NORMAL

## 2020-12-16 LAB
EXTERNAL GROUP B STREP ANTIGEN: NEGATIVE
EXTERNAL GROUP B STREP ANTIGEN: NEGATIVE
EXTERNAL GROUP B STREP ANTIGEN: NORMAL

## 2021-01-15 ENCOUNTER — HOSPITAL ENCOUNTER (INPATIENT)
Facility: HOSPITAL | Age: 41
LOS: 3 days | Discharge: HOME | End: 2021-01-18
Attending: OBSTETRICS & GYNECOLOGY | Admitting: OBSTETRICS & GYNECOLOGY

## 2021-01-15 ENCOUNTER — ANESTHESIA EVENT (OUTPATIENT)
Dept: LABOR AND DELIVERY | Facility: HOSPITAL | Age: 41
End: 2021-01-15

## 2021-01-15 ENCOUNTER — ANESTHESIA (OUTPATIENT)
Dept: LABOR AND DELIVERY | Facility: HOSPITAL | Age: 41
End: 2021-01-15

## 2021-01-15 PROBLEM — Z34.90 PREGNANCY: Status: ACTIVE | Noted: 2021-01-15

## 2021-01-15 LAB
ABO GROUP BLD: NORMAL
BLD GP AB SCN SERPL QL: NEGATIVE
DEPRECATED RDW RBC AUTO: 45.4 FL (ref 37–54)
ERYTHROCYTE [DISTWIDTH] IN BLOOD BY AUTOMATED COUNT: 13.5 % (ref 12.3–15.4)
HCT VFR BLD AUTO: 37.4 % (ref 34–46.6)
HGB BLD-MCNC: 12.7 G/DL (ref 12–15.9)
MCH RBC QN AUTO: 30.9 PG (ref 26.6–33)
MCHC RBC AUTO-ENTMCNC: 34 G/DL (ref 31.5–35.7)
MCV RBC AUTO: 91 FL (ref 79–97)
PLATELET # BLD AUTO: 157 10*3/MM3 (ref 140–450)
PMV BLD AUTO: 11.6 FL (ref 6–12)
RBC # BLD AUTO: 4.11 10*6/MM3 (ref 3.77–5.28)
RH BLD: POSITIVE
SARS-COV-2 RNA RESP QL NAA+PROBE: NOT DETECTED
T&S EXPIRATION DATE: NORMAL
WBC # BLD AUTO: 7.13 10*3/MM3 (ref 3.4–10.8)

## 2021-01-15 PROCEDURE — 86850 RBC ANTIBODY SCREEN: CPT | Performed by: OBSTETRICS & GYNECOLOGY

## 2021-01-15 PROCEDURE — 25010000002 PHENYLEPHRINE PER 1 ML: Performed by: ANESTHESIOLOGY

## 2021-01-15 PROCEDURE — 25010000003 CEFAZOLIN IN DEXTROSE 2-4 GM/100ML-% SOLUTION: Performed by: OBSTETRICS & GYNECOLOGY

## 2021-01-15 PROCEDURE — 25010000002 MORPHINE PER 10 MG: Performed by: ANESTHESIOLOGY

## 2021-01-15 PROCEDURE — 85027 COMPLETE CBC AUTOMATED: CPT | Performed by: OBSTETRICS & GYNECOLOGY

## 2021-01-15 PROCEDURE — 86900 BLOOD TYPING SEROLOGIC ABO: CPT | Performed by: OBSTETRICS & GYNECOLOGY

## 2021-01-15 PROCEDURE — 25010000002 FENTANYL CITRATE (PF) 100 MCG/2ML SOLUTION: Performed by: ANESTHESIOLOGY

## 2021-01-15 PROCEDURE — 25010000002 METOCLOPRAMIDE PER 10 MG: Performed by: ANESTHESIOLOGY

## 2021-01-15 PROCEDURE — U0003 INFECTIOUS AGENT DETECTION BY NUCLEIC ACID (DNA OR RNA); SEVERE ACUTE RESPIRATORY SYNDROME CORONAVIRUS 2 (SARS-COV-2) (CORONAVIRUS DISEASE [COVID-19]), AMPLIFIED PROBE TECHNIQUE, MAKING USE OF HIGH THROUGHPUT TECHNOLOGIES AS DESCRIBED BY CMS-2020-01-R: HCPCS | Performed by: OBSTETRICS & GYNECOLOGY

## 2021-01-15 PROCEDURE — 25010000002 KETOROLAC TROMETHAMINE PER 15 MG: Performed by: ANESTHESIOLOGY

## 2021-01-15 PROCEDURE — 25010000002 ONDANSETRON PER 1 MG: Performed by: ANESTHESIOLOGY

## 2021-01-15 PROCEDURE — 86901 BLOOD TYPING SEROLOGIC RH(D): CPT | Performed by: OBSTETRICS & GYNECOLOGY

## 2021-01-15 RX ORDER — SODIUM CHLORIDE 0.9 % (FLUSH) 0.9 %
3 SYRINGE (ML) INJECTION EVERY 12 HOURS SCHEDULED
Status: DISCONTINUED | OUTPATIENT
Start: 2021-01-15 | End: 2021-01-18

## 2021-01-15 RX ORDER — FENTANYL CITRATE 50 UG/ML
INJECTION, SOLUTION INTRAMUSCULAR; INTRAVENOUS AS NEEDED
Status: DISCONTINUED | OUTPATIENT
Start: 2021-01-15 | End: 2021-01-15 | Stop reason: SURG

## 2021-01-15 RX ORDER — ACETAMINOPHEN 500 MG
1000 TABLET ORAL ONCE
Status: COMPLETED | OUTPATIENT
Start: 2021-01-15 | End: 2021-01-15

## 2021-01-15 RX ORDER — METOCLOPRAMIDE HYDROCHLORIDE 5 MG/ML
10 INJECTION INTRAMUSCULAR; INTRAVENOUS ONCE
Status: COMPLETED | OUTPATIENT
Start: 2021-01-15 | End: 2021-01-15

## 2021-01-15 RX ORDER — ONDANSETRON 2 MG/ML
4 INJECTION INTRAMUSCULAR; INTRAVENOUS EVERY 8 HOURS PRN
Status: DISCONTINUED | OUTPATIENT
Start: 2021-01-15 | End: 2021-01-18 | Stop reason: HOSPADM

## 2021-01-15 RX ORDER — ONDANSETRON 4 MG/1
4 TABLET, FILM COATED ORAL EVERY 8 HOURS PRN
Status: DISCONTINUED | OUTPATIENT
Start: 2021-01-15 | End: 2021-01-18 | Stop reason: HOSPADM

## 2021-01-15 RX ORDER — MISOPROSTOL 200 UG/1
800 TABLET ORAL AS NEEDED
Status: DISCONTINUED | OUTPATIENT
Start: 2021-01-15 | End: 2021-01-18 | Stop reason: HOSPADM

## 2021-01-15 RX ORDER — TRISODIUM CITRATE DIHYDRATE AND CITRIC ACID MONOHYDRATE 500; 334 MG/5ML; MG/5ML
30 SOLUTION ORAL ONCE
Status: COMPLETED | OUTPATIENT
Start: 2021-01-15 | End: 2021-01-15

## 2021-01-15 RX ORDER — OXYTOCIN-SODIUM CHLORIDE 0.9% IV SOLN 30 UNIT/500ML 30-0.9/5 UT/ML-%
250 SOLUTION INTRAVENOUS CONTINUOUS PRN
Status: ACTIVE | OUTPATIENT
Start: 2021-01-15 | End: 2021-01-15

## 2021-01-15 RX ORDER — SODIUM CHLORIDE 0.9 % (FLUSH) 0.9 %
3-10 SYRINGE (ML) INJECTION AS NEEDED
Status: DISCONTINUED | OUTPATIENT
Start: 2021-01-15 | End: 2021-01-18

## 2021-01-15 RX ORDER — CARBOPROST TROMETHAMINE 250 UG/ML
250 INJECTION, SOLUTION INTRAMUSCULAR AS NEEDED
Status: DISCONTINUED | OUTPATIENT
Start: 2021-01-15 | End: 2021-01-18 | Stop reason: HOSPADM

## 2021-01-15 RX ORDER — ONDANSETRON 2 MG/ML
4 INJECTION INTRAMUSCULAR; INTRAVENOUS ONCE AS NEEDED
Status: COMPLETED | OUTPATIENT
Start: 2021-01-15 | End: 2021-01-15

## 2021-01-15 RX ORDER — LANOLIN
CREAM (ML) TOPICAL
Status: DISCONTINUED | OUTPATIENT
Start: 2021-01-15 | End: 2021-01-18 | Stop reason: HOSPADM

## 2021-01-15 RX ORDER — OXYCODONE HYDROCHLORIDE AND ACETAMINOPHEN 5; 325 MG/1; MG/1
1 TABLET ORAL EVERY 4 HOURS PRN
Status: DISCONTINUED | OUTPATIENT
Start: 2021-01-15 | End: 2021-01-18 | Stop reason: HOSPADM

## 2021-01-15 RX ORDER — MORPHINE SULFATE 1 MG/ML
INJECTION, SOLUTION EPIDURAL; INTRATHECAL; INTRAVENOUS AS NEEDED
Status: DISCONTINUED | OUTPATIENT
Start: 2021-01-15 | End: 2021-01-15 | Stop reason: SURG

## 2021-01-15 RX ORDER — ONDANSETRON 2 MG/ML
4 INJECTION INTRAMUSCULAR; INTRAVENOUS ONCE AS NEEDED
Status: DISCONTINUED | OUTPATIENT
Start: 2021-01-15 | End: 2021-01-18 | Stop reason: HOSPADM

## 2021-01-15 RX ORDER — ERYTHROMYCIN 5 MG/G
OINTMENT OPHTHALMIC
Status: DISPENSED
Start: 2021-01-15 | End: 2021-01-16

## 2021-01-15 RX ORDER — OXYTOCIN-SODIUM CHLORIDE 0.9% IV SOLN 30 UNIT/500ML 30-0.9/5 UT/ML-%
999 SOLUTION INTRAVENOUS ONCE
Status: COMPLETED | OUTPATIENT
Start: 2021-01-15 | End: 2021-01-15

## 2021-01-15 RX ORDER — SODIUM CHLORIDE 0.9 % (FLUSH) 0.9 %
3 SYRINGE (ML) INJECTION EVERY 12 HOURS SCHEDULED
Status: DISCONTINUED | OUTPATIENT
Start: 2021-01-15 | End: 2021-01-15 | Stop reason: HOSPADM

## 2021-01-15 RX ORDER — HYDROMORPHONE HYDROCHLORIDE 1 MG/ML
0.5 INJECTION, SOLUTION INTRAMUSCULAR; INTRAVENOUS; SUBCUTANEOUS
Status: ACTIVE | OUTPATIENT
Start: 2021-01-15 | End: 2021-01-16

## 2021-01-15 RX ORDER — PHYTONADIONE 1 MG/.5ML
INJECTION, EMULSION INTRAMUSCULAR; INTRAVENOUS; SUBCUTANEOUS
Status: DISPENSED
Start: 2021-01-15 | End: 2021-01-16

## 2021-01-15 RX ORDER — DIPHENHYDRAMINE HCL 25 MG
25 CAPSULE ORAL EVERY 4 HOURS PRN
Status: DISCONTINUED | OUTPATIENT
Start: 2021-01-15 | End: 2021-01-18 | Stop reason: HOSPADM

## 2021-01-15 RX ORDER — DOCUSATE SODIUM 100 MG/1
100 CAPSULE, LIQUID FILLED ORAL 2 TIMES DAILY PRN
Status: DISCONTINUED | OUTPATIENT
Start: 2021-01-15 | End: 2021-01-18 | Stop reason: HOSPADM

## 2021-01-15 RX ORDER — SODIUM CHLORIDE 0.9 % (FLUSH) 0.9 %
10 SYRINGE (ML) INJECTION AS NEEDED
Status: DISCONTINUED | OUTPATIENT
Start: 2021-01-15 | End: 2021-01-15 | Stop reason: HOSPADM

## 2021-01-15 RX ORDER — KETOROLAC TROMETHAMINE 30 MG/ML
INJECTION, SOLUTION INTRAMUSCULAR; INTRAVENOUS AS NEEDED
Status: DISCONTINUED | OUTPATIENT
Start: 2021-01-15 | End: 2021-01-15 | Stop reason: SURG

## 2021-01-15 RX ORDER — METHYLERGONOVINE MALEATE 0.2 MG/ML
200 INJECTION INTRAVENOUS ONCE AS NEEDED
Status: DISCONTINUED | OUTPATIENT
Start: 2021-01-15 | End: 2021-01-18 | Stop reason: HOSPADM

## 2021-01-15 RX ORDER — SIMETHICONE 80 MG
80 TABLET,CHEWABLE ORAL 4 TIMES DAILY PRN
Status: DISCONTINUED | OUTPATIENT
Start: 2021-01-15 | End: 2021-01-18 | Stop reason: HOSPADM

## 2021-01-15 RX ORDER — DIPHENHYDRAMINE HYDROCHLORIDE 50 MG/ML
25 INJECTION INTRAMUSCULAR; INTRAVENOUS EVERY 4 HOURS PRN
Status: DISCONTINUED | OUTPATIENT
Start: 2021-01-15 | End: 2021-01-18 | Stop reason: HOSPADM

## 2021-01-15 RX ORDER — SODIUM CHLORIDE, SODIUM LACTATE, POTASSIUM CHLORIDE, CALCIUM CHLORIDE 600; 310; 30; 20 MG/100ML; MG/100ML; MG/100ML; MG/100ML
125 INJECTION, SOLUTION INTRAVENOUS CONTINUOUS
Status: DISCONTINUED | OUTPATIENT
Start: 2021-01-15 | End: 2021-01-18 | Stop reason: HOSPADM

## 2021-01-15 RX ORDER — PRENATAL VIT/IRON FUM/FOLIC AC 27MG-0.8MG
1 TABLET ORAL DAILY
Status: DISCONTINUED | OUTPATIENT
Start: 2021-01-15 | End: 2021-01-18 | Stop reason: HOSPADM

## 2021-01-15 RX ORDER — OXYTOCIN-SODIUM CHLORIDE 0.9% IV SOLN 30 UNIT/500ML 30-0.9/5 UT/ML-%
125 SOLUTION INTRAVENOUS CONTINUOUS PRN
Status: COMPLETED | OUTPATIENT
Start: 2021-01-15 | End: 2021-01-15

## 2021-01-15 RX ORDER — HYDROCORTISONE 25 MG/G
CREAM TOPICAL 3 TIMES DAILY PRN
Status: DISCONTINUED | OUTPATIENT
Start: 2021-01-15 | End: 2021-01-18 | Stop reason: HOSPADM

## 2021-01-15 RX ORDER — PROMETHAZINE HYDROCHLORIDE 12.5 MG/1
12.5 SUPPOSITORY RECTAL EVERY 6 HOURS PRN
Status: DISCONTINUED | OUTPATIENT
Start: 2021-01-15 | End: 2021-01-18 | Stop reason: HOSPADM

## 2021-01-15 RX ORDER — OXYCODONE AND ACETAMINOPHEN 10; 325 MG/1; MG/1
1 TABLET ORAL EVERY 4 HOURS PRN
Status: DISCONTINUED | OUTPATIENT
Start: 2021-01-15 | End: 2021-01-18 | Stop reason: HOSPADM

## 2021-01-15 RX ORDER — PROMETHAZINE HYDROCHLORIDE 25 MG/1
25 TABLET ORAL EVERY 6 HOURS PRN
Status: DISCONTINUED | OUTPATIENT
Start: 2021-01-15 | End: 2021-01-18 | Stop reason: HOSPADM

## 2021-01-15 RX ORDER — SODIUM CHLORIDE, SODIUM LACTATE, POTASSIUM CHLORIDE, CALCIUM CHLORIDE 600; 310; 30; 20 MG/100ML; MG/100ML; MG/100ML; MG/100ML
125 INJECTION, SOLUTION INTRAVENOUS CONTINUOUS
Status: DISCONTINUED | OUTPATIENT
Start: 2021-01-15 | End: 2021-01-15 | Stop reason: HOSPADM

## 2021-01-15 RX ORDER — BUPIVACAINE HYDROCHLORIDE 7.5 MG/ML
INJECTION, SOLUTION EPIDURAL; RETROBULBAR AS NEEDED
Status: DISCONTINUED | OUTPATIENT
Start: 2021-01-15 | End: 2021-01-15 | Stop reason: SURG

## 2021-01-15 RX ORDER — MORPHINE SULFATE 2 MG/ML
2 INJECTION, SOLUTION INTRAMUSCULAR; INTRAVENOUS
Status: ACTIVE | OUTPATIENT
Start: 2021-01-15 | End: 2021-01-16

## 2021-01-15 RX ORDER — NALOXONE HCL 0.4 MG/ML
0.2 VIAL (ML) INJECTION
Status: DISCONTINUED | OUTPATIENT
Start: 2021-01-15 | End: 2021-01-18 | Stop reason: HOSPADM

## 2021-01-15 RX ORDER — CEFAZOLIN SODIUM 2 G/100ML
2 INJECTION, SOLUTION INTRAVENOUS ONCE
Status: COMPLETED | OUTPATIENT
Start: 2021-01-15 | End: 2021-01-15

## 2021-01-15 RX ORDER — IBUPROFEN 800 MG/1
800 TABLET ORAL EVERY 8 HOURS PRN
Status: DISCONTINUED | OUTPATIENT
Start: 2021-01-15 | End: 2021-01-18 | Stop reason: HOSPADM

## 2021-01-15 RX ORDER — FAMOTIDINE 10 MG/ML
20 INJECTION, SOLUTION INTRAVENOUS ONCE AS NEEDED
Status: DISCONTINUED | OUTPATIENT
Start: 2021-01-15 | End: 2021-01-15 | Stop reason: HOSPADM

## 2021-01-15 RX ORDER — HYDROXYZINE 50 MG/1
50 TABLET, FILM COATED ORAL EVERY 6 HOURS PRN
Status: DISCONTINUED | OUTPATIENT
Start: 2021-01-15 | End: 2021-01-18 | Stop reason: HOSPADM

## 2021-01-15 RX ADMIN — SODIUM CHLORIDE, POTASSIUM CHLORIDE, SODIUM LACTATE AND CALCIUM CHLORIDE 125 ML/HR: 600; 310; 30; 20 INJECTION, SOLUTION INTRAVENOUS at 09:20

## 2021-01-15 RX ADMIN — OXYCODONE HYDROCHLORIDE AND ACETAMINOPHEN 1 TABLET: 5; 325 TABLET ORAL at 20:27

## 2021-01-15 RX ADMIN — SODIUM CITRATE AND CITRIC ACID MONOHYDRATE 30 ML: 500; 334 SOLUTION ORAL at 10:17

## 2021-01-15 RX ADMIN — Medication 1 TABLET: at 20:27

## 2021-01-15 RX ADMIN — OXYTOCIN 999 ML/HR: 10 INJECTION INTRAVENOUS at 13:42

## 2021-01-15 RX ADMIN — OXYTOCIN 125 ML/HR: 10 INJECTION INTRAVENOUS at 15:08

## 2021-01-15 RX ADMIN — Medication: at 19:56

## 2021-01-15 RX ADMIN — SODIUM CHLORIDE, POTASSIUM CHLORIDE, SODIUM LACTATE AND CALCIUM CHLORIDE 1000 ML: 600; 310; 30; 20 INJECTION, SOLUTION INTRAVENOUS at 08:15

## 2021-01-15 RX ADMIN — ONDANSETRON 4 MG: 2 INJECTION INTRAMUSCULAR; INTRAVENOUS at 10:17

## 2021-01-15 RX ADMIN — METOCLOPRAMIDE HYDROCHLORIDE 10 MG: 5 INJECTION INTRAMUSCULAR; INTRAVENOUS at 16:10

## 2021-01-15 RX ADMIN — FENTANYL CITRATE 20 MCG: 50 INJECTION INTRAMUSCULAR; INTRAVENOUS at 13:22

## 2021-01-15 RX ADMIN — ACETAMINOPHEN 1000 MG: 500 TABLET ORAL at 10:16

## 2021-01-15 RX ADMIN — PHENYLEPHRINE HYDROCHLORIDE 100 MCG: 10 INJECTION INTRAVENOUS at 13:29

## 2021-01-15 RX ADMIN — MORPHINE SULFATE 0.12 MG: 1 INJECTION, SOLUTION EPIDURAL; INTRATHECAL; INTRAVENOUS at 13:22

## 2021-01-15 RX ADMIN — CEFAZOLIN SODIUM 2 G: 2 INJECTION, SOLUTION INTRAVENOUS at 13:10

## 2021-01-15 RX ADMIN — KETOROLAC TROMETHAMINE 30 MG: 30 INJECTION, SOLUTION INTRAMUSCULAR; INTRAVENOUS at 14:04

## 2021-01-15 RX ADMIN — IBUPROFEN 800 MG: 800 TABLET, FILM COATED ORAL at 19:56

## 2021-01-15 RX ADMIN — ONDANSETRON 4 MG: 2 INJECTION INTRAMUSCULAR; INTRAVENOUS at 15:15

## 2021-01-15 RX ADMIN — BUPIVACAINE HYDROCHLORIDE 1.6 ML: 7.5 INJECTION, SOLUTION EPIDURAL; RETROBULBAR at 13:22

## 2021-01-15 RX ADMIN — PHENYLEPHRINE HYDROCHLORIDE 100 MCG: 10 INJECTION INTRAVENOUS at 13:55

## 2021-01-15 NOTE — ANESTHESIA POSTPROCEDURE EVALUATION
"Patient: Starr Ramos    Procedure Summary     Date: 01/15/21 Room / Location:  [facility]    Anesthesia Start: 1313 Anesthesia Stop:     Procedure:  SECTION REPEAT (N/A Abdomen) Diagnosis:     Surgeon: Ted Martinez MD Provider: Deon Smith DO    Anesthesia Type: spinal ASA Status: 2          Anesthesia Type: spinal    Vitals  Vitals Value Taken Time   /70 01/15/21 1630   Temp 36.1 °C (96.9 °F) 01/15/21 1630   Pulse 60 01/15/21 1630   Resp 16 01/15/21 1630   SpO2 97 % 01/15/21 1630           Post Anesthesia Care and Evaluation    Patient location during evaluation: bedside  Patient participation: complete - patient participated  Level of consciousness: awake and alert  Pain management: adequate  Airway patency: patent  Anesthetic complications: No anesthetic complications    Cardiovascular status: acceptable  Respiratory status: acceptable  Hydration status: acceptable    Comments: /70 (BP Location: Right arm, Patient Position: Lying)   Pulse 60   Temp 36.1 °C (96.9 °F) (Oral)   Resp 16   Ht 160 cm (63\")   Wt 83.5 kg (184 lb)   SpO2 97%   Breastfeeding Yes   BMI 32.59 kg/m²       "

## 2021-01-15 NOTE — PLAN OF CARE
Problem: Bleeding ( Delivery)  Goal: Bleeding is Controlled  Outcome: Met     Problem: Change in Fetal Wellbeing ( Delivery)  Goal: Stable Fetal Wellbeing  Outcome: Met  Intervention: Promote and Monitor Fetal Wellbeing  Recent Flowsheet Documentation  Taken 1/15/2021 0810 by Shae Shah RN  Body Position: tilted, left     Problem: Infection ( Delivery)  Goal: Absence of Infection Signs and Symptoms  Outcome: Met     Problem: Respiratory Compromise ( Delivery)  Goal: Effective Oxygenation and Ventilation  Outcome: Met   Goal Outcome Evaluation:

## 2021-01-15 NOTE — INTERVAL H&P NOTE
"/74 (BP Location: Right arm, Patient Position: Sitting)   Pulse 63   Temp 97.6 °F (36.4 °C) (Oral)   Resp 17   Ht 160 cm (63\")   Wt 83.5 kg (184 lb)   SpO2 98%   Breastfeeding Yes   BMI 32.59 kg/m²    Results from last 7 days   Lab Units 01/15/21  0820   WBC 10*3/mm3 7.13   HEMOGLOBIN g/dL 12.7   HEMATOCRIT % 37.4   PLATELETS 10*3/mm3 157     H&P reviewed. The patient was examined and there are no changes to the H&P.    Ted Martinez MD  1/15/2021  14:08 EST      "

## 2021-01-15 NOTE — LACTATION NOTE
Lactation Consult Note  P2 term baby, 4 hrs old. Mom is 41yo C/S with h/o low milk supply. 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 Baby latches well with deep jaw excursions. Encouraged to monitor baby for feeding cues and output. Discussed how to know baby is getting enough breast milk. Also initiated HGP before this feeding, no milk obtained. Mom reports the most milk she made was 2 oz with last baby. Encouraged pumping every 3 hrs if baby is not nursing well, showing feeding cues and low output otherwise to insurance pump.     Evaluation Completed: 1/15/2021 18:07 EST  Patient Name: Starr Ramos  :  1980  MRN:  [id number]     REFERRAL  INFORMATION:                          Date of Referral: 01/15/21   Person Making Referral: patient  Maternal Reason for Referral: maternal age, milk supply concern       DELIVERY HISTORY:  Infant First Feeding: skin-to-skin      Skin to skin initiation date/time: 1/15/2021  1:48 PM   Skin to skin end date/time:           MATERNAL ASSESSMENT:  Breast Size Issue: none (01/15/21 1800)  Breast Shape: Bilateral:, round (01/15/21 1800)  Breast Density: Bilateral:, soft (01/15/21 1800)  Areola: Bilateral:, elastic (01/15/21 1800)  Nipples: Bilateral:, everted (01/15/21 1800)                INFANT ASSESSMENT:  Information for the patient's :  Ramos Ramos [[id number]]   No past medical history on file.     Feeding Readiness Cues: energy for feeding (01/15/21 1800)      Feeding Tolerance/Success: alert for feeding, strong suck (01/15/21 1800)               Feeding Interventions: latch assistance provided (01/15/21 1800)               Breastfeeding: breastfeeding, right side only (01/15/21 1800)   Infant Positioning: cross-cradle (01/15/21 1800)         Effective Latch During Feeding: yes (01/15/21 1800)      Signs of Milk Transfer: deep jaw excursions noted (01/15/21 1800)       Latch: 2-->grasps breast, tongue down, lips flanged,  rhythmic sucking (01/15/21 1800)   Audible Swallowin-->a few with stimulation (01/15/21 1800)   Type of Nipple: 2-->everted (after stimulation) (01/15/21 1800)   Comfort (Breast/Nipple): 2-->soft/nontender (01/15/21 1800)   Hold (Positioning): 1-->minimal assist, teach one side, mother does other, staff holds (01/15/21 1800)   Latch Score: 8 (01/15/21 1800)     Infant-Driven Feeding Scales - Readiness: Alert once handled. Some rooting or takes pacifier. Adequate tone. (01/15/21 1800)               MATERNAL INFANT FEEDING:     Maternal Emotional State: relaxed (01/15/21 1800)  Infant Positioning: cradle (01/15/21 1800)   Signs of Milk Transfer: deep jaw excursions noted (01/15/21 1800)  Pain with Feeding: no (01/15/21 1800)           Milk Ejection Reflex: absent (01/15/21 1800)           Latch Assistance: minimal assistance (01/15/21 1800)                               EQUIPMENT TYPE:  Breast Pump Type: double electric, hospital grade (01/15/21 1800)  Breast Pump Flange Type: hard (01/15/21 1800)  Breast Pump Flange Size: 24 mm (01/15/21 1800)                        BREAST PUMPING:          LACTATION REFERRALS:

## 2021-01-15 NOTE — OP NOTE
[facility]   Section Operative Note    Patient Name: Starr Ramos  :  1980  MRN:  [id number]      Date of procedure:  1/15/2021        Pre-Operative Dx:   1.  IUP at 40w2d weeks   2. Prior C/S    3. AMA        Postoperative Dx:  Same        Procedure: Repeat    via pfannensteil      Surgeon: Ted Martinez MD      Assistant: Avery Whaley MD     Anesthesia: Spinal          Quantitative EBL:      Quantitative Blood Loss (mL): 220 mL         Specimens: A: cord bloods  B: placenta discarded     Findings:                           Amniotic Fluid - thick meconium  Placenta Intact, 3 VC  Uterus normal  Tubes and ovaries normal bilaterally              Infant:           Gender: Viable male  infant     Weight: 3700 g (8 lb 2.5 oz)     Apgars: 8  @ 1 minute /     9  @ 5 minutes                 Indication for C/Section:     Prior C/S    Had hoped for TOLAC but no spontaneous labor and cervix remained unfavorable   Desired to proceed with repeat C/S           Procedure Details:  The patient was taken to the operating room where spinal anesthesia was found to be adequate. She was prepped and draped in the usual sterile manner in the dorsal supine position with leftward tilt. A Pfannenstiel incision was made and carried down to the fascia. The fascia was incised in the mid-line and extended transversely with Bedolla scissors. The fascia was grasped and elevated and  from the underlying rectus muscles superiorly and inferiorly. The peritoneum was identified and entered. Peritoneal incision was extended superiorly and inferiorly with good visualization of the bladder. The bladder blade was inserted and the vesicouterine peritoneum was incised transversely and the bladder flap was created digitally. The bladder blade was reinserted. The thin lower uterine segment was incised in a transverse fashion.  Amniotomy occurred with thick meconium fluid noted.  The vertex was elevated and delivered  through the incision. The remainder of the infant was delivered without difficulty. The umbilical cord was clamped and cut and the infant was handed off the field. Cord ph and cord bloods were obtained. The placenta was removed intact and appeared normal. The uterine incision was closed with running locked sutures of 0 Monocryl.  The abdominal cavity was irrigated and cleared of all clot and debris. The uterine incision was inspected and noted to be hemostatic. Rectus muscles were reapproximated in the midline with 0 Vicryl.  The fascia was closed with 0 PDS in running continuous fashion. The subcutaneous tissue was closed with 3-0 Vicryl. The skin was closed in subcuticular fashion with 4-0 Vicryl.   Instrument, sponge, and needle counts were correct prior the abdominal closure and at the conclusion of the case. Mother and baby  to recovery room in stable condition.              Complications:     None          Antibiotics: cefazolin (Ancef)                      Ted Martinez MD  1/15/2021  14:11 EST  Note done at time of delivery then refreshed at a later date to include RN documentation not yet completed.

## 2021-01-15 NOTE — ANESTHESIA PREPROCEDURE EVALUATION
Anesthesia Evaluation     Patient summary reviewed and Nursing notes reviewed   NPO Solid Status: > 8 hours  NPO Liquid Status: > 2 hours           Airway   Mallampati: II  TM distance: >3 FB  Neck ROM: full  No difficulty expected  Dental - normal exam     Pulmonary - normal exam   (-) not a smoker  Cardiovascular - normal exam  Exercise tolerance: good (4-7 METS)    (+) hypertension, valvular problems/murmurs (dx age 16) MVP,       Neuro/Psych  (+) headaches (migraines occasionally),     GI/Hepatic/Renal/Endo      Musculoskeletal     Abdominal    Substance History      OB/GYN    (+) Pregnant (40+2),         Other        ROS/Med Hx Other: AMA                    Anesthesia Plan    ASA 2     spinal   (I have reviewed the patient's history with the patient and the chart, including all pertinent laboratory results and imaging. I have explained the risks of spinal anesthesia including but not limited to hypotension, PDPH, nerve injury and risk of converting to GA. Patient understands risks and agrees to proceed.  )  intravenous induction     Anesthetic plan, all risks, benefits, and alternatives have been provided, discussed and informed consent has been obtained with: patient.

## 2021-01-16 LAB
BASOPHILS # BLD AUTO: 0.04 10*3/MM3 (ref 0–0.2)
BASOPHILS NFR BLD AUTO: 0.4 % (ref 0–1.5)
DEPRECATED RDW RBC AUTO: 42.6 FL (ref 37–54)
EOSINOPHIL # BLD AUTO: 0.1 10*3/MM3 (ref 0–0.4)
EOSINOPHIL NFR BLD AUTO: 1 % (ref 0.3–6.2)
ERYTHROCYTE [DISTWIDTH] IN BLOOD BY AUTOMATED COUNT: 13 % (ref 12.3–15.4)
HCT VFR BLD AUTO: 35.4 % (ref 34–46.6)
HGB BLD-MCNC: 11.9 G/DL (ref 12–15.9)
IMM GRANULOCYTES # BLD AUTO: 0.06 10*3/MM3 (ref 0–0.05)
IMM GRANULOCYTES NFR BLD AUTO: 0.6 % (ref 0–0.5)
LYMPHOCYTES # BLD AUTO: 2.23 10*3/MM3 (ref 0.7–3.1)
LYMPHOCYTES NFR BLD AUTO: 21.8 % (ref 19.6–45.3)
MCH RBC QN AUTO: 30.3 PG (ref 26.6–33)
MCHC RBC AUTO-ENTMCNC: 33.6 G/DL (ref 31.5–35.7)
MCV RBC AUTO: 90.1 FL (ref 79–97)
MONOCYTES # BLD AUTO: 0.68 10*3/MM3 (ref 0.1–0.9)
MONOCYTES NFR BLD AUTO: 6.7 % (ref 5–12)
NEUTROPHILS NFR BLD AUTO: 69.5 % (ref 42.7–76)
NEUTROPHILS NFR BLD AUTO: 7.1 10*3/MM3 (ref 1.7–7)
NRBC BLD AUTO-RTO: 0 /100 WBC (ref 0–0.2)
PLATELET # BLD AUTO: 144 10*3/MM3 (ref 140–450)
PMV BLD AUTO: 11.5 FL (ref 6–12)
RBC # BLD AUTO: 3.93 10*6/MM3 (ref 3.77–5.28)
WBC # BLD AUTO: 10.21 10*3/MM3 (ref 3.4–10.8)

## 2021-01-16 PROCEDURE — 85025 COMPLETE CBC W/AUTO DIFF WBC: CPT | Performed by: OBSTETRICS & GYNECOLOGY

## 2021-01-16 RX ADMIN — SIMETHICONE 80 MG: 80 TABLET, CHEWABLE ORAL at 20:27

## 2021-01-16 RX ADMIN — DOCUSATE SODIUM 100 MG: 100 CAPSULE ORAL at 08:57

## 2021-01-16 RX ADMIN — DOCUSATE SODIUM 100 MG: 100 CAPSULE ORAL at 20:27

## 2021-01-16 RX ADMIN — OXYCODONE HYDROCHLORIDE AND ACETAMINOPHEN 1 TABLET: 5; 325 TABLET ORAL at 02:09

## 2021-01-16 RX ADMIN — IBUPROFEN 800 MG: 800 TABLET, FILM COATED ORAL at 22:54

## 2021-01-16 RX ADMIN — SIMETHICONE 80 MG: 80 TABLET, CHEWABLE ORAL at 12:22

## 2021-01-16 RX ADMIN — OXYCODONE HYDROCHLORIDE AND ACETAMINOPHEN 1 TABLET: 5; 325 TABLET ORAL at 06:18

## 2021-01-16 RX ADMIN — OXYCODONE HYDROCHLORIDE AND ACETAMINOPHEN 1 TABLET: 10; 325 TABLET ORAL at 16:30

## 2021-01-16 RX ADMIN — IBUPROFEN 800 MG: 800 TABLET, FILM COATED ORAL at 06:17

## 2021-01-16 RX ADMIN — OXYCODONE HYDROCHLORIDE AND ACETAMINOPHEN 1 TABLET: 10; 325 TABLET ORAL at 22:54

## 2021-01-16 RX ADMIN — IBUPROFEN 800 MG: 800 TABLET, FILM COATED ORAL at 14:50

## 2021-01-16 RX ADMIN — OXYCODONE HYDROCHLORIDE AND ACETAMINOPHEN 1 TABLET: 5; 325 TABLET ORAL at 12:22

## 2021-01-16 RX ADMIN — Medication 1 TABLET: at 08:57

## 2021-01-16 NOTE — PLAN OF CARE
Goal Outcome Evaluation:  Plan of Care Reviewed With: patient  Progress: improving  Outcome Summary: pain well controlled, ambulating well, breastfeeding needs some improvement with supplementing, attempting to void, showered

## 2021-01-16 NOTE — PROGRESS NOTES
[facility]   PROGRESS NOTE    Patient Name: Starr Ramos  :  1980  MRN:  [id number]      Post-Op Day 1 S/P    Delivered a male infant.  Subjective     Patient reports:    Pain is well controlled. Voiding and ambulating without difficulty.    Tolerating po. Lochia normal.       The patient plans to breastfeed.         Objective       Vitals: Vital Signs Range for the last 24 hours  Temperature: Temp:  [96.9 °F (36.1 °C)-98.4 °F (36.9 °C)] 97.8 °F (36.6 °C)   Temp Source: Temp src: Oral   BP: BP: (109-138)/(65-81) 126/81   Pulse: Heart Rate:  [49-75] 65   Respirations: Resp:  [16-18] 16         Intake/Output Summary (Last 24 hours) at 2021 0928  Last data filed at 2021 0919  Gross per 24 hour   Intake 1490 ml   Output 2770 ml   Net -1280 ml                                              Physical Exam     General Alert and awake, in NAD      CV Regular rate and rhythm      Lungs clear to auscultation bilaterally        Abdomen Soft, non-distended, fundus firm,  1 below umbilicus, appropriately tender      Incision  Dressing clean, dry and intact.      Extremities  trace edema, calves NT               LABS: Results from last 7 days   Lab Units 21  0641 01/15/21  0820   WBC 10*3/mm3 10.21 7.13   HEMOGLOBIN g/dL 11.9* 12.7   HEMATOCRIT % 35.4 37.4   PLATELETS 10*3/mm3 144 157         Prenatal labs results reviewed:  Yes   Rubella:  immune  Rh Status:    RH type   Date Value Ref Range Status   01/15/2021 Positive  Final                       Assessment/Plan   : 1. POD 1 S/P C/S: Hemodynamically stable.  Doing well.  Continue routine care.   Desires circ d/w pt     Pregnancy          GUSTAVO Ponce  2021  09:28 EST

## 2021-01-17 RX ADMIN — DOCUSATE SODIUM 100 MG: 100 CAPSULE ORAL at 20:57

## 2021-01-17 RX ADMIN — OXYCODONE HYDROCHLORIDE AND ACETAMINOPHEN 1 TABLET: 5; 325 TABLET ORAL at 09:55

## 2021-01-17 RX ADMIN — Medication 1 TABLET: at 09:55

## 2021-01-17 RX ADMIN — OXYCODONE HYDROCHLORIDE AND ACETAMINOPHEN 1 TABLET: 5; 325 TABLET ORAL at 13:57

## 2021-01-17 RX ADMIN — IBUPROFEN 800 MG: 800 TABLET, FILM COATED ORAL at 09:55

## 2021-01-17 RX ADMIN — DOCUSATE SODIUM 100 MG: 100 CAPSULE ORAL at 09:55

## 2021-01-17 RX ADMIN — OXYCODONE HYDROCHLORIDE AND ACETAMINOPHEN 1 TABLET: 5; 325 TABLET ORAL at 20:57

## 2021-01-17 RX ADMIN — BETAMETHASONE VALERATE: 1.2 OINTMENT TOPICAL at 13:58

## 2021-01-17 RX ADMIN — IBUPROFEN 800 MG: 800 TABLET, FILM COATED ORAL at 20:57

## 2021-01-17 RX ADMIN — OXYCODONE HYDROCHLORIDE AND ACETAMINOPHEN 1 TABLET: 5; 325 TABLET ORAL at 05:01

## 2021-01-17 RX ADMIN — SIMETHICONE 80 MG: 80 TABLET, CHEWABLE ORAL at 20:57

## 2021-01-17 RX ADMIN — SIMETHICONE 80 MG: 80 TABLET, CHEWABLE ORAL at 09:55

## 2021-01-17 NOTE — LACTATION NOTE
This note was copied from a baby's chart.  Mother reports that infant has been latching well, easily latches to the breast and mother denies discomfort. Is supplementing with formula. Mother reports good voids. Demonstrated hand expression for mother and encouraged use when attempting to latch, especially if infant sleepy/reluctant. Mother is pumping wit HGP. Encouraged her to call as needed.

## 2021-01-17 NOTE — PROGRESS NOTES
[facility]   PROGRESS NOTE    Patient Name: Starr Ramos  :  1980  MRN:  [id number]      Post-Op 2 S/P   Subjective     Patient reports:   Doing well. Pain well controlled. Tolerating regular diet and having flatus.    Lochia normal.       Objective       Vitals: Vital Signs Range for the last 24 hours  Temperature: Temp:  [97.7 °F (36.5 °C)-98.1 °F (36.7 °C)] 98.1 °F (36.7 °C)       BP: BP: (121-129)/(73-78) 128/78   Pulse: Heart Rate:  [63-69] 69   Respirations: Resp:  [16-17] 17                                         Physical Exam     General Alert       Abdomen Soft, non-distended, fundus firm, 3FB below umbilicus, appropriately tender      Incision  Intact, no erythema or exudate      Extremities Calves NT bilaterally                Assessment/Plan  :   POD 2  -  Doing well.  Continue usual cares. Peds still requesting delay of circ due to hydrocele.           Pregnancy               Ted Martinez MD  2021  12:39 EST

## 2021-01-17 NOTE — LACTATION NOTE
Mother reports that infant clusterfed overnight, nipples getting very tender today, states that it feels like razors when infant latched, pain only in nipple and only when infant latched. Left side looks undamaged, right side has purple crease line. Mother states that infant had shallow latch for a feed last night. Encouraged mother to ensure deep latch with every feeding and that pain will improve with time as nipples adjust to nursing. APNO requested, encouraged mother to utilize for comfort. Discussed that she should pump if unable to latch infant due to the pain to keep up stimulation. OPLC info given, encouraged to call as needed.

## 2021-01-17 NOTE — PLAN OF CARE
Goal Outcome Evaluation:  Plan of Care Reviewed With: patient  Progress: improving  Outcome Summary: pain well controlled, ambulating well, breastfeeding improving, possible d/c today

## 2021-01-18 VITALS
BODY MASS INDEX: 32.6 KG/M2 | DIASTOLIC BLOOD PRESSURE: 87 MMHG | WEIGHT: 184 LBS | RESPIRATION RATE: 16 BRPM | OXYGEN SATURATION: 98 % | HEIGHT: 63 IN | HEART RATE: 62 BPM | TEMPERATURE: 98.3 F | SYSTOLIC BLOOD PRESSURE: 142 MMHG

## 2021-01-18 RX ORDER — OXYCODONE HYDROCHLORIDE AND ACETAMINOPHEN 5; 325 MG/1; MG/1
2 TABLET ORAL EVERY 4 HOURS PRN
Qty: 30 TABLET | Refills: 0 | Status: SHIPPED | OUTPATIENT
Start: 2021-01-18 | End: 2021-01-22

## 2021-01-18 RX ORDER — IBUPROFEN 800 MG/1
800 TABLET ORAL EVERY 8 HOURS PRN
Qty: 60 TABLET | Refills: 0 | Status: ON HOLD | OUTPATIENT
Start: 2021-01-18 | End: 2022-12-28

## 2021-01-18 RX ADMIN — OXYCODONE HYDROCHLORIDE AND ACETAMINOPHEN 1 TABLET: 5; 325 TABLET ORAL at 08:21

## 2021-01-18 RX ADMIN — Medication 1 TABLET: at 08:21

## 2021-01-18 RX ADMIN — IBUPROFEN 800 MG: 800 TABLET, FILM COATED ORAL at 08:21

## 2021-01-18 NOTE — LACTATION NOTE
Mom currently has baby latched. She reports some pinching. Assisted mom with adjusting latch using chin tug and mom reports latch feels better. Encouraged to start nose to nipple to obtain deeper latch. Mom had just pumped 6 cc prior to latching baby. She reports her milk is coming in. Syringe fed baby pumped colostrum while he was BF. Baby tolerated well. Educated on baby's expected output and weight gain. Gave OPLC, zoom and mommy and me info.    Lactation Consult Note    Evaluation Completed: 2021 08:14 EST  Patient Name: Starr Ramos  :  1980  MRN:  [id number]     REFERRAL  INFORMATION:                          Date of Referral: 01/15/21   Person Making Referral: patient  Maternal Reason for Referral: maternal age, milk supply concern       DELIVERY HISTORY:  Infant First Feeding: skin-to-skin      Skin to skin initiation date/time: 1/15/2021  1:48 PM   Skin to skin end date/time:           MATERNAL ASSESSMENT:                               INFANT ASSESSMENT:  Information for the patient's :  Ramos Ramos [[id number]]   No past medical history on file.                                                                                                     MATERNAL INFANT FEEDING:                                                                       EQUIPMENT TYPE:                                 BREAST PUMPING:          LACTATION REFERRALS:

## 2021-01-18 NOTE — DISCHARGE SUMMARY
Date of Discharge:  2021    Discharge Diagnosis: repeat c/s    Presenting Problem/History of Present Illness  Pregnancy [Z34.90]       Hospital Course  Patient is a 40 y.o. female presented for scheduled repeat c/s.  Delivered viable maleinfant per Dr. Martinez.  Did have slight elevation of BP this am but has HA and hasn't slept.  No hx htn/ no evidence preE.  Wants to go home.  Baby needs circ.  If BP remains stable will plan for d/c later today.      Procedures Performed  Procedure(s):   SECTION REPEAT         Condition on Discharge:  Post-Op Day 3 S/P   Subjective     Patient reports:    Doing well - ready for discharge. Pain well controlled. Tolerating po and   having flatus. Voiding and ambulating without difficulty. Lochia normal.     Vital Signs  Temp:  [97.9 °F (36.6 °C)-98.3 °F (36.8 °C)] 98.3 °F (36.8 °C)  Heart Rate:  [62-80] 62  Resp:  [16-17] 16  BP: (134-145)/(83-87) 142/87    Physical Exam    Gen Alert and awake   Abdomen Soft, ND,  Fundus firm with minimal tenderness   Incision  Intact, without erythema or exudate   Extremities Calves NT bilaterally     Assessment/Plan ]   Assessment:  POD 3  -  Doing well. Stable for discharge.     Plan:    Instructions reviewed       Consults:   Consults     No orders found from 2020 to 2021.          Discharge Disposition  Home or Self Care    Discharge Medications     Discharge Medications      Changes to Medications      Instructions Start Date   ibuprofen 800 MG tablet  Commonly known as: ADVIL,MOTRIN  What changed:   · medication strength  · how much to take  · when to take this  · reasons to take this   800 mg, Oral, Every 8 Hours PRN      oxyCODONE-acetaminophen 5-325 MG per tablet  Commonly known as: PERCOCET  What changed:   · how much to take  · when to take this   2 tablets, Oral, Every 4 Hours PRN         Stop These Medications    METHYLFOLATE PO     triamcinolone 0.5 % cream  Commonly known as: KENALOG        ASK your  doctor about these medications      Instructions Start Date   prenatal (CLASSIC) vitamin  tablet  Generic drug: prenatal vitamin   1 tablet, Oral, Daily             The patient has been prescribed a controlled substance.  She has been counseled on the risks associated with using the medication.   The addictive potential of this medication and alternatives were discussed carefully with this patient and she demonstrated understanding.  A SUZIE report has been obtained and reviewed.    Activity at Discharge: restrictions reviewed    Follow-up Appointments  6 weeks      Test Results Pending at Discharge  none     VERONICA Cheema  01/18/21  10:02 EST  Desires circumcision- will do.    Nubia Delacruz MD  01/18/21  10:42 EST

## 2021-04-02 ENCOUNTER — BULK ORDERING (OUTPATIENT)
Dept: CASE MANAGEMENT | Facility: OTHER | Age: 41
End: 2021-04-02

## 2021-04-02 DIAGNOSIS — Z23 IMMUNIZATION DUE: ICD-10-CM

## 2022-03-02 ENCOUNTER — TELEPHONE (OUTPATIENT)
Dept: FAMILY MEDICINE CLINIC | Facility: CLINIC | Age: 42
End: 2022-03-02
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
## 2022-03-04 ENCOUNTER — OFFICE VISIT (OUTPATIENT)
Dept: FAMILY MEDICINE CLINIC | Facility: CLINIC | Age: 42
End: 2022-03-04

## 2022-03-04 VITALS
SYSTOLIC BLOOD PRESSURE: 128 MMHG | OXYGEN SATURATION: 97 % | HEIGHT: 63 IN | HEART RATE: 68 BPM | BODY MASS INDEX: 27.82 KG/M2 | DIASTOLIC BLOOD PRESSURE: 80 MMHG | WEIGHT: 157 LBS

## 2022-03-04 DIAGNOSIS — M25.511 ACUTE PAIN OF RIGHT SHOULDER: Primary | ICD-10-CM

## 2022-03-04 PROCEDURE — 99213 OFFICE O/P EST LOW 20 MIN: CPT | Performed by: NURSE PRACTITIONER
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
## 2022-03-10 ENCOUNTER — TELEPHONE (OUTPATIENT)
Dept: FAMILY MEDICINE CLINIC | Facility: CLINIC | Age: 42
End: 2022-03-10

## 2022-03-10 NOTE — TELEPHONE ENCOUNTER
Caller: Starr Ramos    Relationship: Self     Best call back number: [phone]    What medication are you requesting: SOMETHING FOR HER RIGHT SHOULDER PAIN THAT IS EXTREME.  PATIENT CAN'T BE SEEN BY THE ORTHO UNTIL NEXT WEEK.    What are your current symptoms: THROBBING AND KEEPING HER UP AT NIGHT.    How long have you been experiencing symptoms: 3 WEEKS    Have you had these symptoms before:    [] Yes  [x] No    Have you been treated for these symptoms before:   [] Yes  [x] No    If a prescription is needed, what is your preferred pharmacy and phone number: [facility] [address] [place] [address] - [address] [facility] [address] - [phone]  - [phone] FX     Additional notes:

## 2022-10-27 NOTE — ANESTHESIA PROCEDURE NOTES
Intrathecal Block      Patient reassessed immediately prior to procedure    Patient location during procedure: OB  Start Time: 1/15/2021 1:18 PM  Stop Time: 1/15/2021 1:23 PM  Indication:at surgeon's request and labor analgesia   Performed By  Anesthesiologist: Deon Smith DO  Preanesthetic Checklist  Completed: patient identified, site marked, surgical consent, pre-op evaluation, timeout performed, IV checked, risks and benefits discussed and monitors and equipment checked  Intrathecal Block Prep:  Pt Position:sitting  Sterile Tech:sterile barrier, gloves, cap and mask  Prep:chloraprep  Monitoring:blood pressure monitoring, continuous pulse oximetry and EKG  Intrathecal Block Procedure:  Approach:midline  Guidance:landmark technique and palpation technique  Location:L3-L4  Needle Type:Pencan  Needle Gauge:24 G  Placement of Needle Event: cerebrospinal fluid  Fluid Appearance:clear  Post Assessment:  Patient Tolerance:patient tolerated the procedure well with no apparent complications  Complications:no            
No

## 2022-12-13 LAB — EXTERNAL GROUP B STREP ANTIGEN: NEGATIVE

## 2022-12-28 ENCOUNTER — ANESTHESIA (OUTPATIENT)
Dept: LABOR AND DELIVERY | Facility: HOSPITAL | Age: 42
End: 2022-12-28
Payer: COMMERCIAL

## 2022-12-28 ENCOUNTER — HOSPITAL ENCOUNTER (INPATIENT)
Facility: HOSPITAL | Age: 42
LOS: 4 days | Discharge: HOME | End: 2023-01-01
Attending: OBSTETRICS & GYNECOLOGY | Admitting: STUDENT IN AN ORGANIZED HEALTH CARE EDUCATION/TRAINING PROGRAM
Payer: COMMERCIAL

## 2022-12-28 ENCOUNTER — ANESTHESIA EVENT (OUTPATIENT)
Dept: LABOR AND DELIVERY | Facility: HOSPITAL | Age: 42
End: 2022-12-28
Payer: COMMERCIAL

## 2022-12-28 DIAGNOSIS — Z30.2 STERILIZATION: ICD-10-CM

## 2022-12-28 LAB
ABO GROUP BLD: NORMAL
ALBUMIN SERPL-MCNC: 3.2 G/DL (ref 3.5–5.2)
ALBUMIN/GLOB SERPL: 1.1 G/DL
ALP SERPL-CCNC: 130 U/L (ref 39–117)
ALT SERPL W P-5'-P-CCNC: 8 U/L (ref 1–33)
ANION GAP SERPL CALCULATED.3IONS-SCNC: 13 MMOL/L (ref 5–15)
AST SERPL-CCNC: 16 U/L (ref 1–32)
BASOPHILS # BLD AUTO: 0.03 10*3/MM3 (ref 0–0.2)
BASOPHILS NFR BLD AUTO: 0.3 % (ref 0–1.5)
BILIRUB SERPL-MCNC: 0.2 MG/DL (ref 0–1.2)
BILIRUB UR QL STRIP: NEGATIVE
BLD GP AB SCN SERPL QL: NEGATIVE
BUN SERPL-MCNC: 5 MG/DL (ref 6–20)
BUN/CREAT SERPL: 9.3 (ref 7–25)
CALCIUM SPEC-SCNC: 8.6 MG/DL (ref 8.6–10.5)
CHLORIDE SERPL-SCNC: 105 MMOL/L (ref 98–107)
CLARITY UR: CLEAR
CO2 SERPL-SCNC: 19 MMOL/L (ref 22–29)
COLOR UR: YELLOW
CREAT SERPL-MCNC: 0.54 MG/DL (ref 0.57–1)
DEPRECATED RDW RBC AUTO: 41.8 FL (ref 37–54)
EGFRCR SERPLBLD CKD-EPI 2021: 118.1 ML/MIN/1.73
EOSINOPHIL # BLD AUTO: 0.09 10*3/MM3 (ref 0–0.4)
EOSINOPHIL NFR BLD AUTO: 0.9 % (ref 0.3–6.2)
ERYTHROCYTE [DISTWIDTH] IN BLOOD BY AUTOMATED COUNT: 13.1 % (ref 12.3–15.4)
GLOBULIN UR ELPH-MCNC: 2.9 GM/DL
GLUCOSE SERPL-MCNC: 71 MG/DL (ref 65–99)
GLUCOSE UR STRIP-MCNC: NEGATIVE MG/DL
HCT VFR BLD AUTO: 32.5 % (ref 34–46.6)
HGB BLD-MCNC: 11.3 G/DL (ref 12–15.9)
HGB UR QL STRIP.AUTO: NEGATIVE
IMM GRANULOCYTES # BLD AUTO: 0.1 10*3/MM3 (ref 0–0.05)
IMM GRANULOCYTES NFR BLD AUTO: 1 % (ref 0–0.5)
KETONES UR QL STRIP: NEGATIVE
LEUKOCYTE ESTERASE UR QL STRIP.AUTO: NEGATIVE
LYMPHOCYTES # BLD AUTO: 1.05 10*3/MM3 (ref 0.7–3.1)
LYMPHOCYTES NFR BLD AUTO: 10.1 % (ref 19.6–45.3)
MCH RBC QN AUTO: 30.7 PG (ref 26.6–33)
MCHC RBC AUTO-ENTMCNC: 34.8 G/DL (ref 31.5–35.7)
MCV RBC AUTO: 88.3 FL (ref 79–97)
MONOCYTES # BLD AUTO: 0.49 10*3/MM3 (ref 0.1–0.9)
MONOCYTES NFR BLD AUTO: 4.7 % (ref 5–12)
NEUTROPHILS NFR BLD AUTO: 8.67 10*3/MM3 (ref 1.7–7)
NEUTROPHILS NFR BLD AUTO: 83 % (ref 42.7–76)
NITRITE UR QL STRIP: NEGATIVE
NRBC BLD AUTO-RTO: 0 /100 WBC (ref 0–0.2)
PH UR STRIP.AUTO: 6 [PH] (ref 5–8)
PLATELET # BLD AUTO: 179 10*3/MM3 (ref 140–450)
PMV BLD AUTO: 11 FL (ref 6–12)
POTASSIUM SERPL-SCNC: 3.8 MMOL/L (ref 3.5–5.2)
PROT SERPL-MCNC: 6.1 G/DL (ref 6–8.5)
PROT UR QL STRIP: ABNORMAL
RBC # BLD AUTO: 3.68 10*6/MM3 (ref 3.77–5.28)
RH BLD: POSITIVE
SODIUM SERPL-SCNC: 137 MMOL/L (ref 136–145)
SP GR UR STRIP: 1.01 (ref 1–1.03)
T&S EXPIRATION DATE: NORMAL
UROBILINOGEN UR QL STRIP: ABNORMAL
WBC NRBC COR # BLD: 10.43 10*3/MM3 (ref 3.4–10.8)

## 2022-12-28 PROCEDURE — 80053 COMPREHEN METABOLIC PANEL: CPT | Performed by: STUDENT IN AN ORGANIZED HEALTH CARE EDUCATION/TRAINING PROGRAM

## 2022-12-28 PROCEDURE — 86850 RBC ANTIBODY SCREEN: CPT | Performed by: STUDENT IN AN ORGANIZED HEALTH CARE EDUCATION/TRAINING PROGRAM

## 2022-12-28 PROCEDURE — 86901 BLOOD TYPING SEROLOGIC RH(D): CPT | Performed by: STUDENT IN AN ORGANIZED HEALTH CARE EDUCATION/TRAINING PROGRAM

## 2022-12-28 PROCEDURE — 99202 OFFICE O/P NEW SF 15 MIN: CPT | Performed by: OBSTETRICS & GYNECOLOGY

## 2022-12-28 PROCEDURE — 25010000002 ONDANSETRON PER 1 MG: Performed by: ANESTHESIOLOGY

## 2022-12-28 PROCEDURE — 81003 URINALYSIS AUTO W/O SCOPE: CPT | Performed by: OBSTETRICS & GYNECOLOGY

## 2022-12-28 PROCEDURE — 86900 BLOOD TYPING SEROLOGIC ABO: CPT | Performed by: STUDENT IN AN ORGANIZED HEALTH CARE EDUCATION/TRAINING PROGRAM

## 2022-12-28 PROCEDURE — 85025 COMPLETE CBC W/AUTO DIFF WBC: CPT | Performed by: STUDENT IN AN ORGANIZED HEALTH CARE EDUCATION/TRAINING PROGRAM

## 2022-12-28 PROCEDURE — 87086 URINE CULTURE/COLONY COUNT: CPT | Performed by: OBSTETRICS & GYNECOLOGY

## 2022-12-28 RX ORDER — TRANEXAMIC ACID 10 MG/ML
1000 INJECTION, SOLUTION INTRAVENOUS ONCE AS NEEDED
Status: DISCONTINUED | OUTPATIENT
Start: 2022-12-28 | End: 2022-12-29 | Stop reason: HOSPADM

## 2022-12-28 RX ORDER — METHYLERGONOVINE MALEATE 0.2 MG/ML
200 INJECTION INTRAVENOUS ONCE AS NEEDED
Status: DISCONTINUED | OUTPATIENT
Start: 2022-12-28 | End: 2022-12-29 | Stop reason: HOSPADM

## 2022-12-28 RX ORDER — ACETAMINOPHEN 500 MG
1000 TABLET ORAL ONCE
Status: DISCONTINUED | OUTPATIENT
Start: 2022-12-28 | End: 2022-12-29 | Stop reason: HOSPADM

## 2022-12-28 RX ORDER — ACETAMINOPHEN 500 MG
1000 TABLET ORAL ONCE
Status: COMPLETED | OUTPATIENT
Start: 2022-12-28 | End: 2022-12-28

## 2022-12-28 RX ORDER — MISOPROSTOL 200 UG/1
800 TABLET ORAL ONCE AS NEEDED
Status: DISCONTINUED | OUTPATIENT
Start: 2022-12-28 | End: 2022-12-29 | Stop reason: HOSPADM

## 2022-12-28 RX ORDER — OXYTOCIN/0.9 % SODIUM CHLORIDE 30/500 ML
250 PLASTIC BAG, INJECTION (ML) INTRAVENOUS CONTINUOUS
Status: DISPENSED | OUTPATIENT
Start: 2022-12-29 | End: 2022-12-29

## 2022-12-28 RX ORDER — ASPIRIN 81 MG/1
81 TABLET ORAL DAILY
COMMUNITY
End: 2023-01-01 | Stop reason: HOSPADM

## 2022-12-28 RX ORDER — FAMOTIDINE 10 MG/ML
20 INJECTION, SOLUTION INTRAVENOUS 2 TIMES DAILY
Status: DISCONTINUED | OUTPATIENT
Start: 2022-12-29 | End: 2022-12-29

## 2022-12-28 RX ORDER — HYDROMORPHONE HYDROCHLORIDE 1 MG/ML
0.5 INJECTION, SOLUTION INTRAMUSCULAR; INTRAVENOUS; SUBCUTANEOUS
Status: DISCONTINUED | OUTPATIENT
Start: 2022-12-28 | End: 2022-12-29

## 2022-12-28 RX ORDER — SODIUM CHLORIDE 0.9 % (FLUSH) 0.9 %
10 SYRINGE (ML) INJECTION EVERY 12 HOURS SCHEDULED
Status: DISCONTINUED | OUTPATIENT
Start: 2022-12-28 | End: 2022-12-29 | Stop reason: HOSPADM

## 2022-12-28 RX ORDER — SODIUM CHLORIDE 0.9 % (FLUSH) 0.9 %
10 SYRINGE (ML) INJECTION AS NEEDED
Status: DISCONTINUED | OUTPATIENT
Start: 2022-12-28 | End: 2022-12-29 | Stop reason: HOSPADM

## 2022-12-28 RX ORDER — OXYTOCIN/0.9 % SODIUM CHLORIDE 30/500 ML
999 PLASTIC BAG, INJECTION (ML) INTRAVENOUS ONCE
Status: COMPLETED | OUTPATIENT
Start: 2022-12-28 | End: 2022-12-29

## 2022-12-28 RX ORDER — KETOROLAC TROMETHAMINE 30 MG/ML
30 INJECTION, SOLUTION INTRAMUSCULAR; INTRAVENOUS ONCE
Status: COMPLETED | OUTPATIENT
Start: 2022-12-29 | End: 2022-12-29

## 2022-12-28 RX ORDER — HYDROXYZINE PAMOATE 25 MG/1
25 CAPSULE ORAL ONCE
Status: COMPLETED | OUTPATIENT
Start: 2022-12-28 | End: 2022-12-28

## 2022-12-28 RX ORDER — PRENATAL VIT NO.126/IRON/FOLIC 28MG-0.8MG
TABLET ORAL DAILY
COMMUNITY

## 2022-12-28 RX ORDER — FERROUS SULFATE 325(65) MG
325 TABLET ORAL
COMMUNITY
End: 2023-01-01 | Stop reason: HOSPADM

## 2022-12-28 RX ORDER — FAMOTIDINE 10 MG/ML
20 INJECTION, SOLUTION INTRAVENOUS ONCE AS NEEDED
Status: CANCELLED | OUTPATIENT
Start: 2022-12-28

## 2022-12-28 RX ORDER — ONDANSETRON 2 MG/ML
4 INJECTION INTRAMUSCULAR; INTRAVENOUS ONCE AS NEEDED
Status: CANCELLED | OUTPATIENT
Start: 2022-12-28

## 2022-12-28 RX ORDER — CARBOPROST TROMETHAMINE 250 UG/ML
250 INJECTION, SOLUTION INTRAMUSCULAR
Status: DISCONTINUED | OUTPATIENT
Start: 2022-12-28 | End: 2022-12-29 | Stop reason: HOSPADM

## 2022-12-28 RX ORDER — SODIUM CHLORIDE, SODIUM LACTATE, POTASSIUM CHLORIDE, CALCIUM CHLORIDE 600; 310; 30; 20 MG/100ML; MG/100ML; MG/100ML; MG/100ML
999 INJECTION, SOLUTION INTRAVENOUS CONTINUOUS
Status: DISCONTINUED | OUTPATIENT
Start: 2022-12-28 | End: 2022-12-29

## 2022-12-28 RX ORDER — SODIUM CHLORIDE, SODIUM LACTATE, POTASSIUM CHLORIDE, CALCIUM CHLORIDE 600; 310; 30; 20 MG/100ML; MG/100ML; MG/100ML; MG/100ML
125 INJECTION, SOLUTION INTRAVENOUS CONTINUOUS
Status: DISCONTINUED | OUTPATIENT
Start: 2022-12-28 | End: 2022-12-29

## 2022-12-28 RX ORDER — CEFAZOLIN SODIUM 2 G/100ML
2 INJECTION, SOLUTION INTRAVENOUS ONCE
Status: COMPLETED | OUTPATIENT
Start: 2022-12-29 | End: 2022-12-28

## 2022-12-28 RX ORDER — ONDANSETRON 2 MG/ML
4 INJECTION INTRAMUSCULAR; INTRAVENOUS EVERY 6 HOURS PRN
Status: DISCONTINUED | OUTPATIENT
Start: 2022-12-28 | End: 2022-12-29

## 2022-12-28 RX ADMIN — FAMOTIDINE 20 MG: 10 INJECTION INTRAVENOUS at 23:18

## 2022-12-28 RX ADMIN — HYDROXYZINE PAMOATE 25 MG: 25 CAPSULE ORAL at 21:36

## 2022-12-28 RX ADMIN — CEFAZOLIN SODIUM 2 G: 2 INJECTION, SOLUTION INTRAVENOUS at 23:16

## 2022-12-28 RX ADMIN — SODIUM CHLORIDE, POTASSIUM CHLORIDE, SODIUM LACTATE AND CALCIUM CHLORIDE 999 ML/HR: 600; 310; 30; 20 INJECTION, SOLUTION INTRAVENOUS at 20:30

## 2022-12-28 RX ADMIN — ACETAMINOPHEN 1000 MG: 500 TABLET ORAL at 21:14

## 2022-12-28 RX ADMIN — SODIUM CHLORIDE, POTASSIUM CHLORIDE, SODIUM LACTATE AND CALCIUM CHLORIDE 125 ML/HR: 600; 310; 30; 20 INJECTION, SOLUTION INTRAVENOUS at 22:41

## 2022-12-28 RX ADMIN — ONDANSETRON 4 MG: 2 INJECTION INTRAMUSCULAR; INTRAVENOUS at 23:18

## 2022-12-28 NOTE — OBED NOTES
"[facility] Note OB        Patient Name: Starr Ramos  YOB: 1980  MRN: [id number]  Admission Date: 2022  5:58 PM  Date of Service: 2022    Chief Complaint: Contractions ( 38.5 weeks presents from home with c/o of increasing ctx all day currently about 7-8 minutes apart. Pt denies any leaking or bleeding, reports +FM.)        Subjective     Starr Ramos is a 42 y.o. female  at 38 weeks 5 days with Estimated Date of Delivery: 2023. who presents with the chief complaint listed above.  She sees Ted Martinez MD for her prenatal care. Her pregnancy has been complicated by:  Prior C section x 2, AMA, MTHFR, Hypothyroid on no meds. Patient presents secondary to contractions which began earlier in the day.   She describes fetal movement as normal.  She denies rupture of membranes.  She denies vaginal bleeding. She is feeling contractions.          Objective   Patient Active Problem List    Diagnosis    • Pregnancy [Z34.90]    • Missed  [O02.1]    • Delivered by  section [Z38.01]    • Lymphadenopathy [R59.1]    • Hypothyroidism [E03.9]         OB History    Para Term  AB Living   4 2 2 0 1 2   SAB IAB Ectopic Molar Multiple Live Births   1 0 0 0 0 2      # Outcome Date GA Lbr Chandu/2nd Weight Sex Delivery Anes PTL Lv   4 Current            3 Term 01/15/21 40w2d  3700 g (8 lb 2.5 oz) M CS-LTranv Spinal N ANJANA      Birth Comments: panda 3       Name: FIFI RAMOS      Apgar1: 8  Apgar5: 9   2 Term 18 39w0d  3280 g (7 lb 3.7 oz) M CS-LTranv Spinal N ANJANA      Birth Comments: Jeff OR 1      Name: FIFI RAMOS      Apgar1: 8  Apgar5: 8   1 SAB                 Past Medical History:   Diagnosis Date   • Abnormal Pap smear of cervix    • Chlamydia     Hx of per medical record   • Hypertension     HX OF --PT STATED SHE WAS NEVER ON MEDS. \"I CHANGED MY DIET AND EXERCISE.\"   • Hypothyroidism     Hx of. Not currently on medication; resolved - pt " reports during last pregnancy   • Migraines     Pt reports occasional HAs - takes OTC meds PRN   • Mitral valve prolapse 2009    Hx of. No current problems. Not on medication       Past Surgical History:   Procedure Laterality Date   •  SECTION N/A 2018    Procedure:  SECTION PRIMARY;  Surgeon: Ted Martinez MD;  Location: [facility] LABOR DELIVERY;  Service: Obstetrics/Gynecology   •  SECTION N/A 1/15/2021    Procedure:  SECTION REPEAT;  Surgeon: Ted Martinez MD;  Location: [facility] LABOR DELIVERY;  Service: Obstetrics/Gynecology;  Laterality: N/A;   • D & C WITH SUCTION N/A 2019    Procedure: DILATATION AND CURETTAGE WITH SUCTION;  Surgeon: Ted Martinez MD;  Location: [facility] OR;  Service: Obstetrics/Gynecology   • WISDOM TOOTH EXTRACTION         No current facility-administered medications on file prior to encounter.     Current Outpatient Medications on File Prior to Encounter   Medication Sig Dispense Refill   • aspirin 81 MG EC tablet Take 81 mg by mouth Daily.     • ferrous sulfate 325 (65 FE) MG tablet Take 325 mg by mouth Daily With Breakfast.     • prenatal vitamin (prenatal, CLASSIC, vitamin) tablet Take  by mouth Daily.     • [DISCONTINUED] ibuprofen (ADVIL,MOTRIN) 800 MG tablet Take 1 tablet by mouth Every 8 (Eight) Hours As Needed for Mild Pain . 60 tablet 0       Allergies   Allergen Reactions   • Coconut Oil Anaphylaxis   • Sulfa Antibiotics Hives       Family History   Problem Relation Age of Onset   • Hypertension Father    • Diabetes Father    • Skin cancer Mother    • Hypertension Mother    • Brain cancer Brother    • Hypertension Brother    • Breast cancer Paternal Grandmother    • Lung cancer Paternal Grandmother    • Skin cancer Paternal Grandmother    • Aneurysm Maternal Grandmother    • Malig Hyperthermia Neg Hx        Social History     Socioeconomic History   • Marital status:    Tobacco Use   • Smoking status: Never   •  Smokeless tobacco: Never   Substance and Sexual Activity   • Alcohol use: Yes     Alcohol/week: 3.0 standard drinks     Types: 3 Glasses of wine per week   • Drug use: No   • Sexual activity: Defer     Partners: Male           Review of Systems   Constitutional: Negative for chills, fatigue and fever.   HENT: Negative.    Eyes: Negative for photophobia and visual disturbance.   Respiratory: Negative for cough, chest tightness and shortness of breath.    Cardiovascular: Negative for chest pain and leg swelling.   Gastrointestinal: Positive for abdominal pain (intermittent abdominal pain). Negative for diarrhea, nausea and vomiting.   Genitourinary: Negative for dysuria, flank pain, frequency, hematuria, pelvic pain, urgency, vaginal bleeding and vaginal discharge.   Musculoskeletal: Negative for back pain.   Neurological: Negative for dizziness, seizures, weakness and headaches.        PHYSICAL EXAM:      VITAL SIGNS:  There were no vitals filed for this visit.     FHT'S:                   Baseline:  150 BPM  Variability:  Moderate = 6 - 25 BPM  Accelerations:  10 x 10 accelerations present     Decelerations:  absent  Contractions:   absent     Interpretation:   Reactive NST, CAT 1 tracing        PHYSICAL EXAM:    General: well developed; well nourished  no acute distress   Heart: Not performed.   Lungs: breathing is unlabored     Abdomen: soft, non-tender; no masses  no umbilical or inguinal hernias are present       Cervix: was examined by nurse      Contractions: irregular        Extremities: peripheral pulses normal, no pedal edema, no clubbing or cyanosis      LABS AND TESTING ORDERED:  1. Uterine and fetal monitoring  2. Urinalysis  3. Observation for labor    LAB RESULTS:    No results found for this or any previous visit (from the past 24 hour(s)).    Lab Results   Component Value Date    ABO O 01/15/2021    RH Positive 01/15/2021       Lab Results   Component Value Date    STREPGPB Negative <60 Fat  droplets/HPF 2020    STREPGPB Negative 2020    STREPGPB Negative 2020             Assessment & Plan     ASSESSMENT/PLAN:  Starr Ramos is a 42 y.o. female  at 38w4d with a history of 2 prior  sections presented due to contractions.  Cervix was noted to be closed.  She was noted to be having irregular contractions. Reactive NST CAT 1 tracing.  Case was discussed with Dr Fajardo and she discussed options with the patient.  Decision made to hydrate the patient and Dr Fajardo took over decision making on this patient.          Final Impression:  • Pregnancy at 38w4d, prior Csection x 2  •   • Reactive NST.  CAT 1 tracing  • Irregular contractions present, Abdominal and pelvic pain.  Patient hydrated with IVF and Dr Fajardo will make final disposition on this patient.  • Maternal vital signs were reviewed and were unremarkable                Vitals:    22 2101 22 2116 22 2131 22 2146   BP: 133/68 124/70 123/78 123/71   Pulse: 80 84 83 83   Resp:       Temp:       TempSrc:       Weight:       Height:       •       Lab Results   Component Value Date    STREPGPB Negative <60 Fat droplets/HPF 2020    STREPGPB Negative 2020    STREPGPB Negative 2020   •   Lab Results   Component Value Date    ABO O 01/15/2021    RH Positive 01/15/2021   •   • COVID - 19 status unknown      PLAN:  · Patient is placed in observation to Dr. Yojana Fajardo MD who will provide final assessment and disposition on this patient      I have spent 30 minutes including face to face time with the patient, greater than 50% in discussion of the diagnosis (counseling) and/or coordination of care.     Kirill Perdomo MD  2022  22:10 EST  OB Hospitalist  Phone:    [phone]

## 2022-12-29 PROBLEM — O34.219 HISTORY OF CESAREAN DELIVERY, CURRENTLY PREGNANT: Status: ACTIVE | Noted: 2022-12-29

## 2022-12-29 LAB — BACTERIA SPEC AEROBE CULT: NO GROWTH

## 2022-12-29 PROCEDURE — 25010000002 FENTANYL CITRATE (PF) 50 MCG/ML SOLUTION: Performed by: ANESTHESIOLOGY

## 2022-12-29 PROCEDURE — 0UB70ZZ EXCISION OF BILATERAL FALLOPIAN TUBES, OPEN APPROACH: ICD-10-PCS | Performed by: STUDENT IN AN ORGANIZED HEALTH CARE EDUCATION/TRAINING PROGRAM

## 2022-12-29 PROCEDURE — 25010000002 PHENYLEPHRINE 10 MG/ML SOLUTION: Performed by: NURSE ANESTHETIST, CERTIFIED REGISTERED

## 2022-12-29 PROCEDURE — 25010000002 CEFAZOLIN IN DEXTROSE 2-4 GM/100ML-% SOLUTION: Performed by: STUDENT IN AN ORGANIZED HEALTH CARE EDUCATION/TRAINING PROGRAM

## 2022-12-29 PROCEDURE — 63710000001 DIPHENHYDRAMINE PER 50 MG: Performed by: STUDENT IN AN ORGANIZED HEALTH CARE EDUCATION/TRAINING PROGRAM

## 2022-12-29 PROCEDURE — 25010000002 MORPHINE PER 10 MG: Performed by: ANESTHESIOLOGY

## 2022-12-29 PROCEDURE — 25010000002 OXYTOCIN PER 10 UNITS: Performed by: STUDENT IN AN ORGANIZED HEALTH CARE EDUCATION/TRAINING PROGRAM

## 2022-12-29 PROCEDURE — 88307 TISSUE EXAM BY PATHOLOGIST: CPT

## 2022-12-29 PROCEDURE — 25010000002 KETOROLAC TROMETHAMINE PER 15 MG: Performed by: STUDENT IN AN ORGANIZED HEALTH CARE EDUCATION/TRAINING PROGRAM

## 2022-12-29 PROCEDURE — 88302 TISSUE EXAM BY PATHOLOGIST: CPT | Performed by: STUDENT IN AN ORGANIZED HEALTH CARE EDUCATION/TRAINING PROGRAM

## 2022-12-29 RX ORDER — CALCIUM CARBONATE 200(500)MG
1 TABLET,CHEWABLE ORAL EVERY 4 HOURS PRN
Status: DISCONTINUED | OUTPATIENT
Start: 2022-12-29 | End: 2023-01-01 | Stop reason: HOSPADM

## 2022-12-29 RX ORDER — IBUPROFEN 600 MG/1
600 TABLET ORAL EVERY 6 HOURS
Status: DISCONTINUED | OUTPATIENT
Start: 2022-12-30 | End: 2023-01-01 | Stop reason: HOSPADM

## 2022-12-29 RX ORDER — AMOXICILLIN 250 MG
1 CAPSULE ORAL 2 TIMES DAILY
Status: DISCONTINUED | OUTPATIENT
Start: 2022-12-29 | End: 2023-01-01 | Stop reason: HOSPADM

## 2022-12-29 RX ORDER — DIPHENHYDRAMINE HYDROCHLORIDE 50 MG/ML
25 INJECTION INTRAMUSCULAR; INTRAVENOUS EVERY 4 HOURS PRN
Status: DISCONTINUED | OUTPATIENT
Start: 2022-12-29 | End: 2022-12-30

## 2022-12-29 RX ORDER — SIMETHICONE 80 MG
80 TABLET,CHEWABLE ORAL 4 TIMES DAILY PRN
Status: DISCONTINUED | OUTPATIENT
Start: 2022-12-29 | End: 2023-01-01 | Stop reason: HOSPADM

## 2022-12-29 RX ORDER — OXYTOCIN/0.9 % SODIUM CHLORIDE 30/500 ML
125 PLASTIC BAG, INJECTION (ML) INTRAVENOUS CONTINUOUS PRN
Status: COMPLETED | OUTPATIENT
Start: 2022-12-29 | End: 2022-12-29

## 2022-12-29 RX ORDER — CARBOPROST TROMETHAMINE 250 UG/ML
250 INJECTION, SOLUTION INTRAMUSCULAR ONCE
Status: DISCONTINUED | OUTPATIENT
Start: 2022-12-29 | End: 2022-12-29

## 2022-12-29 RX ORDER — ACETAMINOPHEN 500 MG
1000 TABLET ORAL EVERY 6 HOURS
Status: DISPENSED | OUTPATIENT
Start: 2022-12-29 | End: 2022-12-30

## 2022-12-29 RX ORDER — BISACODYL 5 MG/1
10 TABLET, DELAYED RELEASE ORAL DAILY PRN
Status: DISCONTINUED | OUTPATIENT
Start: 2022-12-29 | End: 2023-01-01 | Stop reason: HOSPADM

## 2022-12-29 RX ORDER — MORPHINE SULFATE 2 MG/ML
2 INJECTION, SOLUTION INTRAMUSCULAR; INTRAVENOUS
Status: ACTIVE | OUTPATIENT
Start: 2022-12-29 | End: 2022-12-30

## 2022-12-29 RX ORDER — MORPHINE SULFATE 1 MG/ML
INJECTION, SOLUTION EPIDURAL; INTRATHECAL; INTRAVENOUS
Status: COMPLETED | OUTPATIENT
Start: 2022-12-29 | End: 2022-12-29

## 2022-12-29 RX ORDER — DIPHENHYDRAMINE HCL 25 MG
25 CAPSULE ORAL EVERY 4 HOURS PRN
Status: DISCONTINUED | OUTPATIENT
Start: 2022-12-29 | End: 2022-12-30

## 2022-12-29 RX ORDER — OXYCODONE HYDROCHLORIDE 10 MG/1
10 TABLET ORAL EVERY 4 HOURS PRN
Status: DISCONTINUED | OUTPATIENT
Start: 2022-12-29 | End: 2023-01-01 | Stop reason: HOSPADM

## 2022-12-29 RX ORDER — ONDANSETRON 4 MG/1
4 TABLET, FILM COATED ORAL EVERY 8 HOURS PRN
Status: DISCONTINUED | OUTPATIENT
Start: 2022-12-29 | End: 2023-01-01 | Stop reason: HOSPADM

## 2022-12-29 RX ORDER — KETOROLAC TROMETHAMINE 15 MG/ML
15 INJECTION, SOLUTION INTRAMUSCULAR; INTRAVENOUS EVERY 6 HOURS
Status: COMPLETED | OUTPATIENT
Start: 2022-12-29 | End: 2022-12-30

## 2022-12-29 RX ORDER — HYDROXYZINE 50 MG/1
50 TABLET, FILM COATED ORAL NIGHTLY PRN
Status: DISCONTINUED | OUTPATIENT
Start: 2022-12-29 | End: 2023-01-01 | Stop reason: HOSPADM

## 2022-12-29 RX ORDER — ALUMINA, MAGNESIA, AND SIMETHICONE 2400; 2400; 240 MG/30ML; MG/30ML; MG/30ML
15 SUSPENSION ORAL EVERY 4 HOURS PRN
Status: DISCONTINUED | OUTPATIENT
Start: 2022-12-29 | End: 2023-01-01 | Stop reason: HOSPADM

## 2022-12-29 RX ORDER — ACETAMINOPHEN 325 MG/1
650 TABLET ORAL EVERY 6 HOURS
Status: DISCONTINUED | OUTPATIENT
Start: 2022-12-30 | End: 2023-01-01 | Stop reason: HOSPADM

## 2022-12-29 RX ORDER — TRANEXAMIC ACID 10 MG/ML
1000 INJECTION, SOLUTION INTRAVENOUS ONCE AS NEEDED
Status: DISCONTINUED | OUTPATIENT
Start: 2022-12-29 | End: 2023-01-01 | Stop reason: HOSPADM

## 2022-12-29 RX ORDER — PHENYLEPHRINE HYDROCHLORIDE 10 MG/ML
INJECTION INTRAVENOUS AS NEEDED
Status: DISCONTINUED | OUTPATIENT
Start: 2022-12-29 | End: 2022-12-29 | Stop reason: SURG

## 2022-12-29 RX ORDER — CARBOPROST TROMETHAMINE 250 UG/ML
250 INJECTION, SOLUTION INTRAMUSCULAR ONCE AS NEEDED
Status: DISCONTINUED | OUTPATIENT
Start: 2022-12-29 | End: 2023-01-01 | Stop reason: HOSPADM

## 2022-12-29 RX ORDER — ONDANSETRON 2 MG/ML
4 INJECTION INTRAMUSCULAR; INTRAVENOUS ONCE AS NEEDED
Status: DISCONTINUED | OUTPATIENT
Start: 2022-12-29 | End: 2023-01-01 | Stop reason: HOSPADM

## 2022-12-29 RX ORDER — GLYCOPYRROLATE 0.2 MG/ML
INJECTION INTRAMUSCULAR; INTRAVENOUS AS NEEDED
Status: DISCONTINUED | OUTPATIENT
Start: 2022-12-29 | End: 2022-12-29 | Stop reason: SURG

## 2022-12-29 RX ORDER — MISOPROSTOL 200 UG/1
600 TABLET ORAL ONCE
Status: DISCONTINUED | OUTPATIENT
Start: 2022-12-29 | End: 2022-12-29

## 2022-12-29 RX ORDER — NALOXONE HCL 0.4 MG/ML
0.2 VIAL (ML) INJECTION
Status: DISCONTINUED | OUTPATIENT
Start: 2022-12-29 | End: 2023-01-01 | Stop reason: HOSPADM

## 2022-12-29 RX ORDER — PHYTONADIONE 1 MG/.5ML
INJECTION, EMULSION INTRAMUSCULAR; INTRAVENOUS; SUBCUTANEOUS
Status: ACTIVE
Start: 2022-12-29 | End: 2022-12-29

## 2022-12-29 RX ORDER — BUPIVACAINE HYDROCHLORIDE 7.5 MG/ML
INJECTION, SOLUTION EPIDURAL; RETROBULBAR
Status: COMPLETED | OUTPATIENT
Start: 2022-12-29 | End: 2022-12-29

## 2022-12-29 RX ORDER — EPHEDRINE SULFATE 50 MG/ML
INJECTION, SOLUTION INTRAVENOUS AS NEEDED
Status: DISCONTINUED | OUTPATIENT
Start: 2022-12-29 | End: 2022-12-29 | Stop reason: SURG

## 2022-12-29 RX ORDER — FENTANYL CITRATE 50 UG/ML
INJECTION, SOLUTION INTRAMUSCULAR; INTRAVENOUS
Status: COMPLETED | OUTPATIENT
Start: 2022-12-29 | End: 2022-12-29

## 2022-12-29 RX ORDER — PRENATAL VIT/IRON FUM/FOLIC AC 27MG-0.8MG
1 TABLET ORAL DAILY
Status: DISCONTINUED | OUTPATIENT
Start: 2022-12-29 | End: 2023-01-01 | Stop reason: HOSPADM

## 2022-12-29 RX ORDER — ERYTHROMYCIN 5 MG/G
OINTMENT OPHTHALMIC
Status: ACTIVE
Start: 2022-12-29 | End: 2022-12-29

## 2022-12-29 RX ORDER — OXYCODONE HYDROCHLORIDE 5 MG/1
5 TABLET ORAL EVERY 4 HOURS PRN
Status: DISCONTINUED | OUTPATIENT
Start: 2022-12-29 | End: 2023-01-01 | Stop reason: HOSPADM

## 2022-12-29 RX ORDER — METHYLERGONOVINE MALEATE 0.2 MG/ML
200 INJECTION INTRAVENOUS ONCE AS NEEDED
Status: DISCONTINUED | OUTPATIENT
Start: 2022-12-29 | End: 2023-01-01 | Stop reason: HOSPADM

## 2022-12-29 RX ORDER — MISOPROSTOL 200 UG/1
600 TABLET ORAL ONCE AS NEEDED
Status: DISCONTINUED | OUTPATIENT
Start: 2022-12-29 | End: 2023-01-01 | Stop reason: HOSPADM

## 2022-12-29 RX ADMIN — KETOROLAC TROMETHAMINE 15 MG: 15 INJECTION, SOLUTION INTRAMUSCULAR; INTRAVENOUS at 15:40

## 2022-12-29 RX ADMIN — EPHEDRINE SULFATE 10 MG: 50 INJECTION INTRAVENOUS at 00:23

## 2022-12-29 RX ADMIN — ACETAMINOPHEN 1000 MG: 500 TABLET, FILM COATED ORAL at 10:53

## 2022-12-29 RX ADMIN — DOCUSATE SODIUM 50MG AND SENNOSIDES 8.6MG 1 TABLET: 8.6; 5 TABLET, FILM COATED ORAL at 08:00

## 2022-12-29 RX ADMIN — Medication 999 ML/HR: at 00:19

## 2022-12-29 RX ADMIN — PHENYLEPHRINE HYDROCHLORIDE 100 MCG: 10 INJECTION INTRAVENOUS at 00:25

## 2022-12-29 RX ADMIN — PHENYLEPHRINE HYDROCHLORIDE 100 MCG: 10 INJECTION INTRAVENOUS at 00:34

## 2022-12-29 RX ADMIN — PHENYLEPHRINE HYDROCHLORIDE 100 MCG: 10 INJECTION INTRAVENOUS at 00:04

## 2022-12-29 RX ADMIN — PHENYLEPHRINE HYDROCHLORIDE 100 MCG: 10 INJECTION INTRAVENOUS at 00:19

## 2022-12-29 RX ADMIN — ACETAMINOPHEN 1000 MG: 500 TABLET, FILM COATED ORAL at 04:36

## 2022-12-29 RX ADMIN — PHENYLEPHRINE HYDROCHLORIDE 100 MCG: 10 INJECTION INTRAVENOUS at 00:38

## 2022-12-29 RX ADMIN — KETOROLAC TROMETHAMINE 30 MG: 30 INJECTION, SOLUTION INTRAMUSCULAR; INTRAVENOUS at 01:18

## 2022-12-29 RX ADMIN — Medication 1 TABLET: at 08:00

## 2022-12-29 RX ADMIN — DOCUSATE SODIUM 50MG AND SENNOSIDES 8.6MG 1 TABLET: 8.6; 5 TABLET, FILM COATED ORAL at 22:35

## 2022-12-29 RX ADMIN — PHENYLEPHRINE HYDROCHLORIDE 100 MCG: 10 INJECTION INTRAVENOUS at 00:30

## 2022-12-29 RX ADMIN — PHENYLEPHRINE HYDROCHLORIDE 100 MCG: 10 INJECTION INTRAVENOUS at 00:10

## 2022-12-29 RX ADMIN — PHENYLEPHRINE HYDROCHLORIDE 100 MCG: 10 INJECTION INTRAVENOUS at 01:00

## 2022-12-29 RX ADMIN — DIPHENHYDRAMINE HYDROCHLORIDE 25 MG: 25 CAPSULE ORAL at 05:48

## 2022-12-29 RX ADMIN — PHENYLEPHRINE HYDROCHLORIDE 100 MCG: 10 INJECTION INTRAVENOUS at 00:14

## 2022-12-29 RX ADMIN — KETOROLAC TROMETHAMINE 15 MG: 15 INJECTION, SOLUTION INTRAMUSCULAR; INTRAVENOUS at 22:31

## 2022-12-29 RX ADMIN — PHENYLEPHRINE HYDROCHLORIDE 100 MCG: 10 INJECTION INTRAVENOUS at 00:01

## 2022-12-29 RX ADMIN — PHENYLEPHRINE HYDROCHLORIDE 100 MCG: 10 INJECTION INTRAVENOUS at 00:07

## 2022-12-29 RX ADMIN — EPHEDRINE SULFATE 10 MG: 50 INJECTION INTRAVENOUS at 00:06

## 2022-12-29 RX ADMIN — FENTANYL CITRATE 20 MCG: 0.05 INJECTION, SOLUTION INTRAMUSCULAR; INTRAVENOUS at 00:00

## 2022-12-29 RX ADMIN — PHENYLEPHRINE HYDROCHLORIDE 100 MCG: 10 INJECTION INTRAVENOUS at 00:12

## 2022-12-29 RX ADMIN — PHENYLEPHRINE HYDROCHLORIDE 100 MCG: 10 INJECTION INTRAVENOUS at 00:03

## 2022-12-29 RX ADMIN — PHENYLEPHRINE HYDROCHLORIDE 100 MCG: 10 INJECTION INTRAVENOUS at 00:23

## 2022-12-29 RX ADMIN — ACETAMINOPHEN 1000 MG: 500 TABLET, FILM COATED ORAL at 20:41

## 2022-12-29 RX ADMIN — OXYTOCIN 125 ML/HR: 10 INJECTION, SOLUTION INTRAMUSCULAR; INTRAVENOUS at 01:55

## 2022-12-29 RX ADMIN — BUPIVACAINE HYDROCHLORIDE 1.6 ML: 7.5 INJECTION, SOLUTION EPIDURAL; RETROBULBAR at 00:00

## 2022-12-29 RX ADMIN — PHENYLEPHRINE HYDROCHLORIDE 100 MCG: 10 INJECTION INTRAVENOUS at 00:45

## 2022-12-29 RX ADMIN — SIMETHICONE 80 MG: 80 TABLET, CHEWABLE ORAL at 15:40

## 2022-12-29 RX ADMIN — PHENYLEPHRINE HYDROCHLORIDE 100 MCG: 10 INJECTION INTRAVENOUS at 00:27

## 2022-12-29 RX ADMIN — SODIUM CHLORIDE, POTASSIUM CHLORIDE, SODIUM LACTATE AND CALCIUM CHLORIDE: 600; 310; 30; 20 INJECTION, SOLUTION INTRAVENOUS at 01:25

## 2022-12-29 RX ADMIN — MORPHINE SULFATE 150 MCG: 1 INJECTION, SOLUTION EPIDURAL; INTRATHECAL; INTRAVENOUS at 00:00

## 2022-12-29 RX ADMIN — GLYCOPYRROLATE 0.2 MCG: 1 INJECTION INTRAMUSCULAR; INTRAVENOUS at 00:15

## 2022-12-29 RX ADMIN — PHENYLEPHRINE HYDROCHLORIDE 100 MCG: 10 INJECTION INTRAVENOUS at 00:21

## 2022-12-29 RX ADMIN — KETOROLAC TROMETHAMINE 15 MG: 15 INJECTION, SOLUTION INTRAMUSCULAR; INTRAVENOUS at 08:00

## 2022-12-29 RX ADMIN — Medication 1 APPLICATION: at 15:40

## 2022-12-29 RX ADMIN — EPHEDRINE SULFATE 10 MG: 50 INJECTION INTRAVENOUS at 00:44

## 2022-12-29 RX ADMIN — PHENYLEPHRINE HYDROCHLORIDE 100 MCG: 10 INJECTION INTRAVENOUS at 00:48

## 2022-12-29 RX ADMIN — PHENYLEPHRINE HYDROCHLORIDE 100 MCG: 10 INJECTION INTRAVENOUS at 00:16

## 2022-12-29 RX ADMIN — PHENYLEPHRINE HYDROCHLORIDE 100 MCG: 10 INJECTION INTRAVENOUS at 00:08

## 2022-12-29 NOTE — ANESTHESIA PREPROCEDURE EVALUATION
Anesthesia Evaluation     Patient summary reviewed and Nursing notes reviewed   NPO Solid Status: > 8 hours  NPO Liquid Status: > 2 hours           Airway   Mallampati: II  TM distance: >3 FB  Neck ROM: full  No difficulty expected  Dental - normal exam     Pulmonary - normal exam   (-) not a smoker  Cardiovascular - normal exam  Exercise tolerance: good (4-7 METS)    (+) hypertension, valvular problems/murmurs (dx age 16) MVP,       Neuro/Psych  (+) headaches (migraines occasionally),    GI/Hepatic/Renal/Endo      Musculoskeletal     Abdominal    Substance History      OB/GYN    (+) Pregnant (40+2),         Other        ROS/Med Hx Other: AMA                        Anesthesia Plan    ASA 2     spinal     (I have reviewed the patient's history with the patient and the chart, including all pertinent laboratory results and imaging. I have explained the risks of spinal anesthesia including but not limited to hypotension, PDPH, nerve injury and risk of converting to GA. Patient understands risks and agrees to proceed. 38w 4 d  )  intravenous induction     Anesthetic plan, risks, benefits, and alternatives have been provided, discussed and informed consent has been obtained with: patient.

## 2022-12-29 NOTE — L&D DELIVERY NOTE
"[facility]   Section Operative Note    Patient Name: Starr Ramos  :  1980  MRN:  [id number]      Date of procedure:  2022        Pre-Operative Dx:   1.  IUP at 38w5d weeks   2. Prior C/S Latent Labor  3. Desire for Permanent Sterilization   4. Elevated Blood Pressure Without Diagnosis of Hypertension         Postoperative Dx:  Same        Procedure: repeat  Repeat Low Transverse  With Bilateral Salpingectomy via Pfannenstiel Incision Pfannensteil      Surgeon: Yojana Fajardo MD      Assistant: Avery Whaley MD     Anesthesia: Spinal      EBL:    Quantitative EBL:  600    Quantitative Blood Loss (mL): 601 mL         Specimens: A: Placenta  B: Bilateral Fallopian Tubes     Findings:                           Amniotic Fluid : Clear  Placenta Intact, 3 VC  Thin lower uterine segment consistent with laboring uterus, otherwise normal uterus   Tubes and ovaries normal bilaterally          Infant:           Gender: Viable female  infant (\"Robin\")     Weight: 3390 g (7 lb 7.6 oz)     Apgars: 8  @ 1 minute /     9  @ 5 minutes                 Indication for C/Section:   Painful contractions with concern for labor in setting of two prior  sections   Prior C/S               Procedure Details:    The patient was taken to the operating room where spinal anesthesia was placed and found to be adequate.  She was then placed in the dorsal supine position with left lateral tilt and prepped and draped in the usual sterile fashion. Sapp catheter was placed. Time out was performed. A Pfannenstiel skin incision was then made with the scalpel through previous incision scar and carried through to the underlying layer of fascia. The fascia was incised in the midline and the incision extended laterally with the Bedolla scissors. The superior aspect of the fascial incision was was then grasped with the Kocher clamps, elevated, and the underlying rectus muscles dissected off bluntly. Attention was " then turned to the inferior aspect of this incision, which, in similar fashion, was grasped with the Kocher clamps, and the rectus muscles dissected off bluntly. The rectus muscles were then  in the midline, and the peritoneum identified, tented up, and entered  bluntly. The peritoneal incision was then extended superiorly and inferiorly with good visualization of the bladder.     The bladder blade was then inserted and the vesicouterine peritoneum identified well-distal to the site of desired hysterotomy and thus no bladder flap was created. The lower uterine segment incised in a transverse fashion with the scalpel. The uterine incision was then extended superolaterally bluntly. The bladder blade was removed and the infant's head delivered atraumatically, followed by delivery of the rest of the infant's body. There were no nuchal cords. The umbilical cord was clamped and cut and the infant was passed off to waiting pediatricians.     The placenta was then removed manually, the uterus exteriorized, and cleared of all clot and debris. Cord blood was obtained and sent to pathology. The uterine incision was repaired with 0-Monocryl in a running locked fashion in a single hemostatic layer. At this time, attention was turned to the salpingectomy portion of the procedure.    The right fallopian tube was traced out to the fimbriated end and grasped with a Lars clamp. A window was then made in an avascular portion of the mesosalpinx and the mesosalpinx then clamped, transected with the Bovie electrocautery, and suture ligated with 0-Chromic. This process was repeated along the tube to approximately 3cm from the uterine cornua at which point the fallopian tube was clamped, transected, and ligated and sent off the field for pathology. Attention was turned to the left fallopian tube which was similarly removed via the process of sequential clamp, transect, ligate to within 3cm of the uterine cornua at which point  it was excised and sent off the field to pathology. On inspection, the pedicles were noted to be hemostatic.    The posterior cul de sac was irrigated and cleared of all clot and debris. The uterus was returned to the abdomen and paracolic gutters were cleared of all clot and debris. The uterus was re-examined and found to be hemostatic,as were the salpingectomy pedicles bilaterally. We then turned our attention to the muscle and fasical edges. Small bleeders were cauterized with Bovie electrocautery. The fascia was reapproximated with 0-Vicryl in a running fashion. The subcutaneous tissue was reapproximated with 3-0 Monocryl, and the skin was closed with 4-0 Monocryl.    Instrument, sponge, and needle counts were correct prior the abdominal closure and at the conclusion of the case. Mother and baby  to recovery room in stable condition.            Complications:     None          Antibiotics: cefazolin (Ancef)                      Yojana Fajardo MD  12/29/2022  03:50 EST

## 2022-12-29 NOTE — ANESTHESIA PROCEDURE NOTES
Spinal Block      Patient location during procedure: OB  Start Time: 12/29/2022 12:00 AM  Indication:at surgeon's request  Performed By  Anesthesiologist: aLmbert Griffin MD  CRNA/CAA: Susy Noble CRNA  Preanesthetic Checklist  Completed: patient identified, IV checked, site marked, risks and benefits discussed, surgical consent, monitors and equipment checked, pre-op evaluation and timeout performed  Spinal Block Prep:  Patient Position:sitting  Sterile Tech:cap, gloves, mask and sterile barriers  Prep:Chloraprep  Patient Monitoring:blood pressure monitoring and continuous pulse oximetry    Spinal Block Procedure  Approach:midline  Guidance:palpation technique  Location:L4-L5  Needle Type:Sprotte  Needle Gauge:24 G  Placement of Spinal needle event:cerebrospinal fluid aspirated  Paresthesia: no  Fluid Appearance:clear  Medications: fentaNYL citrate (PF) (SUBLIMAZE) injection - Intrathecal   20 mcg - 12/29/2022 12:00:00 AM  Morphine PF injection - Intrathecal   150 mcg - 12/29/2022 12:00:00 AM  bupivacaine PF (MARCAINE) 0.75 % injection - Spinal   1.6 mL - 12/29/2022 12:00:00 AM   Post Assessment  Patient Tolerance:patient tolerated the procedure well with no apparent complications  Complications no

## 2022-12-29 NOTE — H&P
[facility]- Labor & Delivery History and Physical      Patient Name: Starr Ramos  YOB: 1980  MRN: [id number]      Chief Complaint   Patient presents with   • Contractions      38.4 weeks presents from home with c/o of increasing ctx all day currently about 7-8 minutes apart. Pt denies any leaking or bleeding, reports +FM.       Subjective     Patient is a 42 y.o. female  currently at 38w5d, who presents with complaint of painful contractions since this morning that have become more intense and closer togethet. She denies LOF, VB. Endorses good FM.      Initial BP on presentation noted to be mild range > Denies HA, vision changes, SOA/chest pain, RUQ/epigastric pain, sudden-onset edema.     Her prenatal care is complicated by:  - History of  x2  - Desire for permanent sterilization  - Elevated Blood pressure without diagnosis of HTN      Prenatal Information:  External Prenatal Results     Pregnancy Outside Results - Transcribed From Office Records - See Scanned Records For Details     Test Value Date Time    ABO  O  22    Rh  Positive  22    Antibody Screen  Negative  22    Varicella IgG       Rubella ^ Immune  20     Hgb  11.3 g/dL 22    Hct  32.5 % 22    Glucose Fasting GTT       Glucose Tolerance Test 1 hour       Glucose Tolerance Test 3 hour       Gonorrhea (discrete)       Chlamydia (discrete)       RPR ^ Non-Reactive  20     VDRL       Syphilis Antibody       HBsAg ^ Negative  20     Herpes Simplex Virus PCR       Herpes Simplex VIrus Culture       HIV ^ Non-Reactive  20     Hep C RNA Quant PCR       Hep C Antibody ^ Negative  20     AFP       Group B Strep ^ Negative  20       ^ Negative  20       ^ Negative <60 Fat droplets/HPF  20     GBS Susceptibility to Clindamycin       GBS Susceptibility to Erythromycin       Fetal Fibronectin       Genetic Testing,  "Maternal Blood             Drug Screening     Test Value Date Time    Urine Drug Screen       Amphetamine Screen       Barbiturate Screen       Benzodiazepine Screen       Methadone Screen       Phencyclidine Screen       Opiates Screen       THC Screen       Cocaine Screen       Propoxyphene Screen       Buprenorphine Screen       Methamphetamine Screen       Oxycodone Screen       Tricyclic Antidepressants Screen             Legend    ^: Historical                         Past OB History:     OB History    Para Term  AB Living   4 2 2 0 1 2   SAB IAB Ectopic Molar Multiple Live Births   1 0 0 0 0 2      # Outcome Date GA Lbr Chandu/2nd Weight Sex Delivery Anes PTL Lv   4 Current            3 Term 01/15/21 40w2d  3700 g (8 lb 2.5 oz) M CS-LTranv Spinal N ANJANA      Birth Comments: tio 3       Name: FIFI FRANKLIN      Apgar1: 8  Apgar5: 9   2 Term 18 39w0d  3280 g (7 lb 3.7 oz) M CS-LTranv Spinal N ANJANA      Birth Comments: Tio OR 1      Name: FIFI FRANKLIN      Apgar1: 8  Apgar5: 8   1 SAB                Past Medical History: Past Medical History:   Diagnosis Date   • Abnormal Pap smear of cervix    • Hypertension     HX OF --PT STATED SHE WAS NEVER ON MEDS. \"I CHANGED MY DIET AND EXERCISE.\"   • Hypothyroidism     Hx of. Not currently on medication; resolved - pt reports during last pregnancy   • Migraines     Pt reports occasional HAs - takes OTC meds PRN   • Mitral valve prolapse     Hx of. No current problems. Not on medication      Past Surgical History Past Surgical History:   Procedure Laterality Date   •  SECTION N/A 2018    Procedure:  SECTION PRIMARY;  Surgeon: Ted Martinez MD;  Location: [facility] LABOR DELIVERY;  Service: Obstetrics/Gynecology   •  SECTION N/A 1/15/2021    Procedure:  SECTION REPEAT;  Surgeon: Ted Martinez MD;  Location: [facility] LABOR DELIVERY;  Service: Obstetrics/Gynecology;  Laterality: N/A;   • D & C WITH " SUCTION N/A 12/5/2019    Procedure: DILATATION AND CURETTAGE WITH SUCTION;  Surgeon: Ted Martinez MD;  Location: [facility];  Service: Obstetrics/Gynecology   • WISDOM TOOTH EXTRACTION  2002      Family History: Family History   Problem Relation Age of Onset   • Hypertension Father    • Diabetes Father    • Skin cancer Mother    • Hypertension Mother    • Brain cancer Brother    • Hypertension Brother    • Breast cancer Paternal Grandmother    • Lung cancer Paternal Grandmother    • Skin cancer Paternal Grandmother    • Aneurysm Maternal Grandmother    • Malig Hyperthermia Neg Hx       Social History:  reports that she has never smoked. She has never used smokeless tobacco.   reports current alcohol use of about 3.0 standard drinks per week.   reports no history of drug use.        General ROS:   Review of Systems   Constitutional: Negative for chills and fever.   Respiratory: Negative for cough and shortness of breath.    Cardiovascular: Negative for chest pain and palpitations.   Gastrointestinal: Negative for abdominal pain, constipation, diarrhea, nausea and vomiting.   Genitourinary: Negative for dysuria.        Objective       Vital Signs Range for the last 24 hours  Temperature: Temp:  [98.3 °F (36.8 °C)] 98.3 °F (36.8 °C)   Temp Source: Temp src: Oral   BP: BP: (121-144)/(57-78) 123/71   Pulse: Heart Rate:  [80-96] 83   Respirations: Resp:  [16] 16   SPO2:     O2 Amount (l/min):     O2 Devices     Weight: Weight:  [82.6 kg (182 lb)] 82.6 kg (182 lb)       Labs on Admission:  Results from last 7 days   Lab Units 12/28/22  2247   WBC 10*3/mm3 10.43   HEMOGLOBIN g/dL 11.3*   HEMATOCRIT % 32.5*   PLATELETS 10*3/mm3 179           Physical Examination:   Physical Exam  Vitals reviewed.   Constitutional:       General: She is not in acute distress.     Appearance: Normal appearance.   HENT:      Head: Normocephalic and atraumatic.   Eyes:      Extraocular Movements: Extraocular movements intact.       Pupils: Pupils are equal, round, and reactive to light.   Cardiovascular:      Rate and Rhythm: Normal rate and regular rhythm.      Pulses: Normal pulses.      Heart sounds: Normal heart sounds.   Pulmonary:      Effort: Pulmonary effort is normal.      Breath sounds: Normal breath sounds.   Abdominal:      Tenderness: There is no abdominal tenderness. There is no guarding.      Comments: Gravid; Fundal height appropriate for GA   Skin:     General: Skin is warm and dry.   Neurological:      General: No focal deficit present.      Mental Status: She is alert and oriented to person, place, and time.   Psychiatric:         Mood and Affect: Mood normal.          Presentation: Cephalic    Cervix: Exam by: Method: sterile exam per physician   Dilation: Cervical Dilation (cm): 0   Effacement: Cervical Effacement: 50-60%   Station:         Fetal Heart Rate Assessment   Method: Fetal HR Assessment Method: external   Beats/min: Fetal HR (beats/min): 135   Baseline: Fetal HR Baseline: normal range   Variability: Fetal HR Variability: moderate (amplitude range 6 to 25 bpm)   Accels: Fetal HR Accelerations: greater than/equal to 15 bpm, lasting at least 15 seconds   Decels: Fetal HR Decelerations: absent   Tracing Category:       Uterine Assessment   Method: Method: palpation, external tocotransducer   Frequency (min): Contraction Frequency (Minutes): 6-8   Ctx Count in 10 min:     Duration:     Intensity: Contraction Intensity: mild by palpation   Intensity by IUPC:     Resting Tone: Uterine Resting Tone: soft by palpation   Resting Tone by IUPC:     Burke Units:         Assessment & Plan       Pregnancy        1.  Intrauterine pregnancy at 38w5d weeks gestation presenting with painful contractions since this AM. Patient reports worsening of symptoms despite IV hydration, 1000mg of Tylenol, and 25mg of Hydroxyzine. Counseled regarding concern for labor in setting of persistent contractions and h/o of  x2 and  patient elects to proceed with delivery at this time due to potential risk of uterine rupture. Reassuring fetal status. Her desire for sterilization was confirmed and her consents were visualized in her chart. Discussed plan for salpingectomy if possible due to association between fallopian tube and future risk of ovarian cancer and patient amenable, verbalizes/understands that this is an irreversible procedure. Plan of care has been reviewed with patient and family.  All questions answered. Kefzol pre-op ppx ordered.         Yojana Fajardo MD  [facility]  12/29/2022  01:43 EST

## 2022-12-29 NOTE — PROGRESS NOTES
POD0-- delivered early this am via RC/S.  Resting quietly in room sts w/ baby.  Reports feels well, just very tired.    AFVSS  F/c in place w/ clear yellow urine.    D/w routine care.    Debra Armas, VERONICA   12/29/22   09:39 EST

## 2022-12-29 NOTE — LACTATION NOTE
Mom wanting assistance with latching baby. Assisted mom with waking and latching baby but baby is sleepy at this time and wont BF. Baby placed in CECI. Encouraged mom to try to BF again within 30 min and call LC as needed    Lactation Consult Note    Evaluation Completed: 2022 08:48 EST  Patient Name: Starr Ramos  :  1980  MRN:  [id number]     REFERRAL  INFORMATION:                                         DELIVERY HISTORY:        Skin to skin initiation date/time:      Skin to skin end date/time:           MATERNAL ASSESSMENT:                               INFANT ASSESSMENT:  Information for the patient's :  Emani Ramos [[id number]]   No past medical history on file.                                                                                                     MATERNAL INFANT FEEDING:                                                                       EQUIPMENT TYPE:                                 BREAST PUMPING:          LACTATION REFERRALS:

## 2022-12-29 NOTE — ANESTHESIA POSTPROCEDURE EVALUATION
"Patient: Starr Ramos    Procedure Summary     Date: 22 Room / Location:  [facility]    Anesthesia Start:  Anesthesia Stop: 22    Procedure:  SECTION REPEAT WITH TUBAL (Abdomen) Diagnosis:     Surgeons: Yojana Fajardo MD Provider: Lambert Griffin MD    Anesthesia Type: spinal ASA Status: 2          Anesthesia Type: spinal    Vitals  Vitals Value Taken Time   /64 22 0330   Temp 36.6 °C (97.8 °F) 225   Pulse 83 22   Resp 16 22   SpO2 96 % 22   Vitals shown include unvalidated device data.        Post Anesthesia Care and Evaluation    Patient location during evaluation: bedside  Patient participation: complete - patient participated  Level of consciousness: awake and alert  Pain management: adequate    Airway patency: patent  Anesthetic complications: No anesthetic complications    Cardiovascular status: acceptable  Respiratory status: acceptable  Hydration status: acceptable    Comments: /64   Pulse 83   Temp 36.6 °C (97.8 °F)   Resp 16   Ht 157.5 cm (62\")   Wt 82.6 kg (182 lb)   SpO2 98%   Breastfeeding Yes   BMI 33.29 kg/m²       "

## 2022-12-29 NOTE — OP NOTE
"Psychiatric   Section Operative Note    Patient Name: Starr Ramos  :  1980  MRN:  [id number]      Date of procedure:  2022        Pre-Operative Dx:   1.  IUP at 38w5d weeks   2. Prior C/S Latent Labor  3. Desire for Permanent Sterilization   4. Elevated Blood Pressure Without Diagnosis of Hypertension         Postoperative Dx:  Same        Procedure: repeat  Repeat Low Transverse  With Bilateral Salpingectomy via Pfannenstiel Incision Pfannensteil      Surgeon: Yojana Fajardo MD      Assistant: Avery Whaley MD     Anesthesia: Spinal      EBL:    Quantitative EBL:  600    Quantitative Blood Loss (mL): 601 mL         Specimens: A: Placenta  B: Bilateral Fallopian Tubes     Findings:                           Amniotic Fluid : Clear  Placenta Intact, 3 VC  Thin lower uterine segment consistent with laboring uterus, otherwise normal uterus   Tubes and ovaries normal bilaterally          Infant:           Gender: Viable female  infant (\"Robin\")     Weight: 3390 g (7 lb 7.6 oz)     Apgars: 8  @ 1 minute /     9  @ 5 minutes                 Indication for C/Section:   Painful contractions with concern for labor in setting of two prior  sections   Prior C/S               Procedure Details:    The patient was taken to the operating room where spinal anesthesia was placed and found to be adequate.  She was then placed in the dorsal supine position with left lateral tilt and prepped and draped in the usual sterile fashion. Sapp catheter was placed. Time out was performed. A Pfannenstiel skin incision was then made with the scalpel through previous incision scar and carried through to the underlying layer of fascia. The fascia was incised in the midline and the incision extended laterally with the Bedolla scissors. The superior aspect of the fascial incision was was then grasped with the Kocher clamps, elevated, and the underlying rectus muscles dissected off bluntly. Attention was " then turned to the inferior aspect of this incision, which, in similar fashion, was grasped with the Kocher clamps, and the rectus muscles dissected off bluntly. The rectus muscles were then  in the midline, and the peritoneum identified, tented up, and entered  bluntly. The peritoneal incision was then extended superiorly and inferiorly with good visualization of the bladder.     The bladder blade was then inserted and the vesicouterine peritoneum identified well-distal to the site of desired hysterotomy and thus no bladder flap was created. The lower uterine segment incised in a transverse fashion with the scalpel. The uterine incision was then extended superolaterally bluntly. The bladder blade was removed and the infant's head delivered atraumatically, followed by delivery of the rest of the infant's body. There were no nuchal cords. The umbilical cord was clamped and cut and the infant was passed off to waiting pediatricians.     The placenta was then removed manually, the uterus exteriorized, and cleared of all clot and debris. Cord blood was obtained and sent to pathology. The uterine incision was repaired with 0-Monocryl in a running locked fashion in a single hemostatic layer. At this time, attention was turned to the salpingectomy portion of the procedure.    The right fallopian tube was traced out to the fimbriated end and grasped with a Lars clamp. A window was then made in an avascular portion of the mesosalpinx and the mesosalpinx then clamped, transected with the Bovie electrocautery, and suture ligated with 0-Chromic. This process was repeated along the tube to approximately 3cm from the uterine cornua at which point the fallopian tube was clamped, transected, and ligated and sent off the field for pathology. Attention was turned to the left fallopian tube which was similarly removed via the process of sequential clamp, transect, ligate to within 3cm of the uterine cornua at which point  it was excised and sent off the field to pathology. On inspection, the pedicles were noted to be hemostatic.    The posterior cul de sac was irrigated and cleared of all clot and debris. The uterus was returned to the abdomen and paracolic gutters were cleared of all clot and debris. The uterus was re-examined and found to be hemostatic,as were the salpingectomy pedicles bilaterally. We then turned our attention to the muscle and fasical edges. Small bleeders were cauterized with Bovie electrocautery. The fascia was reapproximated with 0-Vicryl in a running fashion. The subcutaneous tissue was reapproximated with 3-0 Monocryl, and the skin was closed with 4-0 Monocryl.    Instrument, sponge, and needle counts were correct prior the abdominal closure and at the conclusion of the case. Mother and baby  to recovery room in stable condition.            Complications:     None          Antibiotics: cefazolin (Ancef)                      Yojana Fajardo MD  12/29/2022  03:50 EST

## 2022-12-30 LAB
BASOPHILS # BLD AUTO: 0.02 10*3/MM3 (ref 0–0.2)
BASOPHILS NFR BLD AUTO: 0.2 % (ref 0–1.5)
DEPRECATED RDW RBC AUTO: 43.3 FL (ref 37–54)
EOSINOPHIL # BLD AUTO: 0.33 10*3/MM3 (ref 0–0.4)
EOSINOPHIL NFR BLD AUTO: 3.9 % (ref 0.3–6.2)
ERYTHROCYTE [DISTWIDTH] IN BLOOD BY AUTOMATED COUNT: 13.2 % (ref 12.3–15.4)
HCT VFR BLD AUTO: 33.3 % (ref 34–46.6)
HGB BLD-MCNC: 10.7 G/DL (ref 12–15.9)
IMM GRANULOCYTES # BLD AUTO: 0.09 10*3/MM3 (ref 0–0.05)
IMM GRANULOCYTES NFR BLD AUTO: 1.1 % (ref 0–0.5)
LAB AP CASE REPORT: NORMAL
LYMPHOCYTES # BLD AUTO: 1.77 10*3/MM3 (ref 0.7–3.1)
LYMPHOCYTES NFR BLD AUTO: 20.8 % (ref 19.6–45.3)
MCH RBC QN AUTO: 28.6 PG (ref 26.6–33)
MCHC RBC AUTO-ENTMCNC: 32.1 G/DL (ref 31.5–35.7)
MCV RBC AUTO: 89 FL (ref 79–97)
MONOCYTES # BLD AUTO: 0.65 10*3/MM3 (ref 0.1–0.9)
MONOCYTES NFR BLD AUTO: 7.6 % (ref 5–12)
NEUTROPHILS NFR BLD AUTO: 5.66 10*3/MM3 (ref 1.7–7)
NEUTROPHILS NFR BLD AUTO: 66.4 % (ref 42.7–76)
NRBC BLD AUTO-RTO: 0 /100 WBC (ref 0–0.2)
PATH REPORT.FINAL DX SPEC: NORMAL
PATH REPORT.GROSS SPEC: NORMAL
PLATELET # BLD AUTO: 176 10*3/MM3 (ref 140–450)
PMV BLD AUTO: 10.4 FL (ref 6–12)
RBC # BLD AUTO: 3.74 10*6/MM3 (ref 3.77–5.28)
WBC NRBC COR # BLD: 8.52 10*3/MM3 (ref 3.4–10.8)

## 2022-12-30 PROCEDURE — 25010000002 KETOROLAC TROMETHAMINE PER 15 MG: Performed by: STUDENT IN AN ORGANIZED HEALTH CARE EDUCATION/TRAINING PROGRAM

## 2022-12-30 PROCEDURE — 85025 COMPLETE CBC W/AUTO DIFF WBC: CPT | Performed by: STUDENT IN AN ORGANIZED HEALTH CARE EDUCATION/TRAINING PROGRAM

## 2022-12-30 RX ORDER — TRIAMCINOLONE ACETONIDE 1 MG/G
1 CREAM TOPICAL EVERY 12 HOURS SCHEDULED
Status: DISCONTINUED | OUTPATIENT
Start: 2022-12-30 | End: 2023-01-01 | Stop reason: HOSPADM

## 2022-12-30 RX ORDER — DIPHENHYDRAMINE HCL 25 MG
50 CAPSULE ORAL EVERY 4 HOURS PRN
Status: DISCONTINUED | OUTPATIENT
Start: 2022-12-30 | End: 2023-01-01 | Stop reason: HOSPADM

## 2022-12-30 RX ADMIN — KETOROLAC TROMETHAMINE 15 MG: 15 INJECTION, SOLUTION INTRAMUSCULAR; INTRAVENOUS at 04:18

## 2022-12-30 RX ADMIN — OXYCODONE 5 MG: 5 TABLET ORAL at 00:29

## 2022-12-30 RX ADMIN — SIMETHICONE 80 MG: 80 TABLET, CHEWABLE ORAL at 17:34

## 2022-12-30 RX ADMIN — TRIAMCINOLONE ACETONIDE 1 APPLICATION: 1 CREAM TOPICAL at 17:45

## 2022-12-30 RX ADMIN — ACETAMINOPHEN 650 MG: 325 TABLET, FILM COATED ORAL at 17:34

## 2022-12-30 RX ADMIN — ACETAMINOPHEN 650 MG: 325 TABLET, FILM COATED ORAL at 10:18

## 2022-12-30 RX ADMIN — OXYCODONE 5 MG: 5 TABLET ORAL at 06:09

## 2022-12-30 RX ADMIN — ACETAMINOPHEN 650 MG: 325 TABLET, FILM COATED ORAL at 02:49

## 2022-12-30 RX ADMIN — IBUPROFEN 600 MG: 600 TABLET, FILM COATED ORAL at 10:19

## 2022-12-30 RX ADMIN — DOCUSATE SODIUM 50MG AND SENNOSIDES 8.6MG 1 TABLET: 8.6; 5 TABLET, FILM COATED ORAL at 10:20

## 2022-12-30 RX ADMIN — IBUPROFEN 600 MG: 600 TABLET, FILM COATED ORAL at 23:16

## 2022-12-30 RX ADMIN — IBUPROFEN 600 MG: 600 TABLET, FILM COATED ORAL at 17:34

## 2022-12-30 RX ADMIN — BISACODYL 10 MG: 5 TABLET ORAL at 17:46

## 2022-12-30 RX ADMIN — DOCUSATE SODIUM 50MG AND SENNOSIDES 8.6MG 1 TABLET: 8.6; 5 TABLET, FILM COATED ORAL at 20:46

## 2022-12-30 RX ADMIN — Medication 1 TABLET: at 10:18

## 2022-12-30 RX ADMIN — OXYCODONE 5 MG: 5 TABLET ORAL at 17:35

## 2022-12-30 NOTE — PROGRESS NOTES
[facility]   PROGRESS NOTE    Patient Name: Starr Ramos  :  1980  MRN:  [id number]      Post-Op Day 1 S/P    Delivered a female infant.  Subjective     Patient reports:    Pain is well controlled. Voiding and ambulating without difficulty.    Tolerating po. Lochia normal.       The patient plans to breastfeed.         Objective       Vitals: Vital Signs Range for the last 24 hours  Temperature: Temp:  [97.4 °F (36.3 °C)-98 °F (36.7 °C)] 97.8 °F (36.6 °C)   Temp Source: Temp src: Oral   BP: BP: (100-111)/(61-69) 105/65   Pulse: Heart Rate:  [69-83] 74   Respirations: Resp:  [16-17] 16         Intake/Output Summary (Last 24 hours) at 2022 0842  Last data filed at 2022 0240  Gross per 24 hour   Intake 480 ml   Output 2325 ml   Net -1845 ml                                              Physical Exam     General Alert and awake, in NAD      CV Regular rate and rhythm      Lungs clear to auscultation bilaterally        Abdomen Soft, non-distended, fundus firm,  1 below umbilicus, appropriately tender      Incision  Dressing clean, dry and intact.      Extremities  0 edema, calves NT               LABS: Results from last 7 days   Lab Units 22  2247   WBC 10*3/mm3 10.43   HEMOGLOBIN g/dL 11.3*   HEMATOCRIT % 32.5*   PLATELETS 10*3/mm3 179         Prenatal labs results reviewed:  Yes   Rubella:  Immune  Rh Status:    RH type   Date Value Ref Range Status   2022 Positive  Final                       Assessment & Plan   : 1. POD 1 S/P C/S: Hemodynamically stable.  Doing well. Still sore (RLTCS w/ PS). Had some urinary issues last night, had to use a catheter. Has been able to urinate on her own q2hr (3x) this AM thus far. Continue routine care.       Pregnancy    History of  delivery, currently pregnant          GUSTAVO Lundberg  2022  08:42 EST

## 2022-12-30 NOTE — LACTATION NOTE
Patient has Baby Robin latched to her left breast in cradle hold but feels it is painful. LC observed a shallow latch nd baby was removed with a gloved finger revealing an elongated and pinched nipple. LC assisted patient in latching baby more deeply and changing her position so infant could be rolled up on her chest. Patient has  her two previous children successfully.

## 2022-12-31 RX ORDER — CETIRIZINE HYDROCHLORIDE 10 MG/1
10 TABLET ORAL DAILY
Status: DISCONTINUED | OUTPATIENT
Start: 2022-12-31 | End: 2023-01-01 | Stop reason: HOSPADM

## 2022-12-31 RX ADMIN — DOCUSATE SODIUM 50MG AND SENNOSIDES 8.6MG 1 TABLET: 8.6; 5 TABLET, FILM COATED ORAL at 09:03

## 2022-12-31 RX ADMIN — CETIRIZINE HYDROCHLORIDE 10 MG: 10 TABLET ORAL at 12:35

## 2022-12-31 RX ADMIN — IBUPROFEN 600 MG: 600 TABLET, FILM COATED ORAL at 12:35

## 2022-12-31 RX ADMIN — IBUPROFEN 600 MG: 600 TABLET, FILM COATED ORAL at 05:24

## 2022-12-31 RX ADMIN — OXYCODONE 5 MG: 5 TABLET ORAL at 01:41

## 2022-12-31 RX ADMIN — IBUPROFEN 600 MG: 600 TABLET, FILM COATED ORAL at 18:35

## 2022-12-31 RX ADMIN — ACETAMINOPHEN 650 MG: 325 TABLET, FILM COATED ORAL at 15:11

## 2022-12-31 RX ADMIN — ACETAMINOPHEN 650 MG: 325 TABLET, FILM COATED ORAL at 01:41

## 2022-12-31 RX ADMIN — ACETAMINOPHEN 650 MG: 325 TABLET, FILM COATED ORAL at 09:01

## 2022-12-31 RX ADMIN — ACETAMINOPHEN 650 MG: 325 TABLET, FILM COATED ORAL at 21:07

## 2022-12-31 RX ADMIN — Medication 1 TABLET: at 09:01

## 2022-12-31 RX ADMIN — DOCUSATE SODIUM 50MG AND SENNOSIDES 8.6MG 1 TABLET: 8.6; 5 TABLET, FILM COATED ORAL at 21:06

## 2022-12-31 RX ADMIN — TRIAMCINOLONE ACETONIDE 1 APPLICATION: 1 CREAM TOPICAL at 11:30

## 2022-12-31 NOTE — PLAN OF CARE
Problem: Adult Inpatient Plan of Care  Goal: Plan of Care Review  Outcome: Ongoing, Progressing  Flowsheets (Taken 12/31/2022 0515)  Progress: improving  Plan of Care Reviewed With: patient  Outcome Evaluation: VSS, fundus and lochia WDL, up ad killian, voiding without difficulty, incision DORA WDL, ointment used for rash from OR scrub, working on breast and bottle feeding, pain control with PO meds  Goal: Patient-Specific Goal (Individualized)  Outcome: Ongoing, Progressing  Goal: Absence of Hospital-Acquired Illness or Injury  Outcome: Ongoing, Progressing  Intervention: Identify and Manage Fall Risk  Description: Perform standard risk assessment on admission using a validated tool or comprehensive approach appropriate to the patient; reassess fall risk frequently, with change in status or transfer to another level of care.  Communicate fall injury risk to interprofessional healthcare team.  Determine need for increased observation, equipment and environmental modification, such as low bed, signage and supportive, nonskid footwear.  Adjust safety measures to individual developmental age, stage and identified risk factors.  Reinforce the importance of safety and physical activity with patient and family.  Perform regular intentional rounding to assess need for position change, pain assessment and personal needs, including assistance with toileting.  Recent Flowsheet Documentation  Taken 12/31/2022 0430 by Tracey Ng RN  Safety Promotion/Fall Prevention: safety round/check completed  Taken 12/31/2022 0335 by Tracey Ng RN  Safety Promotion/Fall Prevention: safety round/check completed  Taken 12/31/2022 0230 by Tracey Ng RN  Safety Promotion/Fall Prevention: safety round/check completed  Taken 12/31/2022 0141 by Tracey Ng RN  Safety Promotion/Fall Prevention: safety round/check completed  Taken 12/31/2022 0025 by Tracey Ng RN  Safety Promotion/Fall Prevention: safety round/check  completed  Taken 12/30/2022 2316 by Tracey Ng RN  Safety Promotion/Fall Prevention: safety round/check completed  Taken 12/30/2022 2225 by Tracey Ng RN  Safety Promotion/Fall Prevention: safety round/check completed  Taken 12/30/2022 2100 by Tracey Ng RN  Safety Promotion/Fall Prevention: safety round/check completed  Taken 12/30/2022 1945 by Tracey Ng RN  Safety Promotion/Fall Prevention: safety round/check completed  Intervention: Prevent Skin Injury  Description: Perform a screening for skin injury risk, such as pressure or moisture associated skin damage on admission and at regular intervals throughout hospital stay.  Keep all areas of skin (especially folds) clean and dry.  Maintain adequate skin hydration.  Relieve and redistribute pressure and protect bony prominences; implement measures based on patient-specific risk factors.  Match turning and repositioning schedule to clinical condition.  Encourage weight shift frequently; assist with reposition if unable to complete independently.  Float heels off bed; avoid pressure on the Achilles tendon.  Keep skin free from extended contact with medical devices.  Encourage functional activity and mobility, as early as tolerated.  Use aids (e.g., slide boards, mechanical lift) during transfer.  Recent Flowsheet Documentation  Taken 12/30/2022 1945 by Tracey Ng RN  Body Position: position changed independently  Intervention: Prevent and Manage VTE (Venous Thromboembolism) Risk  Description: Assess for VTE (venous thromboembolism) risk.  Encourage and assist with early ambulation.  Initiate and maintain compression or other therapy, as indicated, based on identified risk in accordance with organizational protocol and provider order.  Encourage both active and passive leg exercises while in bed, if unable to ambulate.  Recent Flowsheet Documentation  Taken 12/30/2022 1945 by Tracey Ng RN  Activity Management: up ad  killian  Intervention: Prevent Infection  Description: Maintain skin and mucous membrane integrity; promote hand, oral and pulmonary hygiene.  Optimize fluid balance, nutrition, sleep and glycemic control to maximize infection resistance.  Identify potential sources of infection early to prevent or mitigate progression of infection (e.g., wound, lines, devices).  Evaluate ongoing need for invasive devices; remove promptly when no longer indicated.  Recent Flowsheet Documentation  Taken 12/30/2022 1945 by Tracey Ng RN  Infection Prevention:   hand hygiene promoted   rest/sleep promoted  Goal: Optimal Comfort and Wellbeing  Outcome: Ongoing, Progressing  Intervention: Monitor Pain and Promote Comfort  Description: Assess pain level, treatment efficacy and patient response at regular intervals using a consistent pain scale.  Consider the presence and impact of preexisting chronic pain.  Encourage patient and caregiver involvement in pain assessment, interventions and safety measures.  Recent Flowsheet Documentation  Taken 12/31/2022 0141 by Tracey Ng RN  Pain Management Interventions: see MAR  Taken 12/30/2022 2316 by Tracey Ng RN  Pain Management Interventions: see MAR  Intervention: Provide Person-Centered Care  Description: Use a family-focused approach to care.  Develop trust and rapport by proactively providing information, encouraging questions, addressing concerns and offering reassurance.  Acknowledge emotional response to hospitalization.  Recognize and utilize personal coping strategies.  Honor spiritual and cultural preferences.  Recent Flowsheet Documentation  Taken 12/30/2022 1945 by Tracey Ng RN  Trust Relationship/Rapport:   care explained   choices provided   questions answered   questions encouraged  Goal: Readiness for Transition of Care  Outcome: Ongoing, Progressing     Problem: Skin Injury Risk Increased  Goal: Skin Health and Integrity  Outcome: Ongoing,  Progressing  Intervention: Optimize Skin Protection  Description: Perform a full pressure injury risk assessment, as indicated by screening, upon admission to care unit.  Reassess skin (injury risk, full inspection) frequently (e.g., scheduled interval, with change in condition) to provide optimal early detection and prevention.  Maintain adequate tissue perfusion (e.g., encourage fluid balance; avoid crossing legs, constrictive clothing or devices) to promote tissue oxygenation.  Maintain head of bed at lowest degree of elevation tolerated, considering medical condition and other restrictions.  Avoid positioning onto an area that remains reddened.  Minimize incontinence and moisture (e.g., toileting schedule; moisture-wicking pad, diaper or incontinence collection device; skin moisture barrier).  Cleanse skin promptly and gently when soiled utilizing a pH-balanced cleanser.  Relieve and redistribute pressure (e.g., scheduled position changes, weight shifts, use of support surface, medical device repositioning, protective dressing application, use of positioning device, microclimate control, use of pressure-injury-monitor  Encourage increased activity, such as sitting in a chair at the bedside or early mobilization, when able to tolerate.  Recent Flowsheet Documentation  Taken 12/30/2022 1945 by Tracey Ng, RN  Head of Bed (HOB) Positioning: HOB elevated     Problem: Fall Injury Risk  Goal: Absence of Fall and Fall-Related Injury  Outcome: Ongoing, Progressing  Intervention: Identify and Manage Contributors  Description: Develop a fall prevention plan with the patient and caregiver/family.  Provide reorientation, appropriate sensory stimulation and routines with changes in mental status to decrease risk of fall.  Promote use of personal vision and auditory aids.  Assess assistance level required for safe and effective self-care; provide support as needed, such as toileting, mobilization. For age 65 and older,  implement timed toileting with assistance.  Encourage physical activity, such as performance of mobility and self-care at highest level of patient ability, multicomponent exercise program and provision of appropriate assistive devices.  If fall occurs, assess the severity of injury; implement fall injury protocol. Determine the cause and revise fall injury prevention plan.  Regularly review medication contribution to fall risk; adjust medication administration times to minimize risk of falling.  Consider risk related to polypharmacy and age.  Balance adequate pain management with potential for oversedation.  Recent Flowsheet Documentation  Taken 12/30/2022 1945 by Tracey Ng RN  Medication Review/Management: medications reviewed  Intervention: Promote Injury-Free Environment  Description: Provide a safe, barrier-free environment that encourages independent activity.  Keep care area uncluttered and well-lighted.  Determine need for increased observation or monitoring.  Avoid use of devices that minimize mobility, such as restraints or indwelling urinary catheter.  Recent Flowsheet Documentation  Taken 12/31/2022 0430 by Tracey Ng RN  Safety Promotion/Fall Prevention: safety round/check completed  Taken 12/31/2022 0335 by Tracey Ng RN  Safety Promotion/Fall Prevention: safety round/check completed  Taken 12/31/2022 0230 by Tracey Ng RN  Safety Promotion/Fall Prevention: safety round/check completed  Taken 12/31/2022 0141 by Tracey Ng RN  Safety Promotion/Fall Prevention: safety round/check completed  Taken 12/31/2022 0025 by Tracey Ng RN  Safety Promotion/Fall Prevention: safety round/check completed  Taken 12/30/2022 2316 by Tracey Ng RN  Safety Promotion/Fall Prevention: safety round/check completed  Taken 12/30/2022 2225 by Tracey Ng RN  Safety Promotion/Fall Prevention: safety round/check completed  Taken 12/30/2022 2100 by Tracey Ng RN  Safety  Promotion/Fall Prevention: safety round/check completed  Taken 12/30/2022 1945 by Tracey Ng, RN  Safety Promotion/Fall Prevention: safety round/check completed   Goal Outcome Evaluation:  Plan of Care Reviewed With: patient        Progress: improving  Outcome Evaluation: VSS, fundus and lochia WDL, up ad killian, voiding without difficulty, incision DORA WDL, ointment used for rash from OR scrub, working on breast and bottle feeding, pain control with PO meds

## 2022-12-31 NOTE — PROGRESS NOTES
[facility]   PROGRESS NOTE    Patient Name: Starr Ramos  :  1980  MRN:  [id number]      Post-Op Day 2 S/P    Delivered a female infant.  Subjective     Patient reports:    Pain is well controlled. Voiding and ambulating without difficulty. Tolerating po. Lochia normal.       The patient plans to breastfeed.         Objective       Vitals: Vital Signs Range for the last 24 hours  Temperature: Temp:  [97 °F (36.1 °C)-97.9 °F (36.6 °C)] 97 °F (36.1 °C)   Temp Source: Temp src: Oral   BP: BP: (105-130)/(60-72) 107/60   Pulse: Heart Rate:  [72-76] 76   Respirations: Resp:  [16] 16         Intake/Output Summary (Last 24 hours) at 2022 0837  Last data filed at 2022 1840  Gross per 24 hour   Intake --   Output 3 ml   Net -3 ml                                              Physical Exam     General Alert and awake, in NAD      CV Regular rate and rhythm      Lungs clear to auscultation bilaterally        Abdomen Soft, non-distended, fundus firm,  2 below umbilicus, appropriately tender      Incision  Dressing clean, dry and intact.      Extremities  tr edema, calves NT               LABS: Results from last 7 days   Lab Units 22  0847 22  2247   WBC 10*3/mm3 8.52 10.43   HEMOGLOBIN g/dL 10.7* 11.3*   HEMATOCRIT % 33.3* 32.5*   PLATELETS 10*3/mm3 176 179                         Assessment & Plan   : 1. POD 2 S/P C/S: Hemodynamically stable.  Doing well.  Continue routine care. Having some itching and redness of abdomen, back, behind knees.  Has triamcinolone cream.  Knows she reacts easily- benadryl and claritin on chart.      Pregnancy    History of  delivery, currently pregnant          Nubia Delacruz MD  2022  08:37 EST

## 2022-12-31 NOTE — LACTATION NOTE
"This note was copied from a baby's chart.  Mom states that she has a breast pump provided by insurance at home but she would like a hand pump to use while here in the hospital.  She states that \"baby is latching well but her milk isn't in yet.\"  Given hand pump, basin, soap & syringes with instructions on use/cleaning & syringing EBM to baby.  Encouraged to prioritize baby latching & use hand pump after feedings for extra stimulation.  Verbalized understanding.  "

## 2023-01-01 VITALS
TEMPERATURE: 98.3 F | RESPIRATION RATE: 16 BRPM | HEART RATE: 78 BPM | DIASTOLIC BLOOD PRESSURE: 79 MMHG | SYSTOLIC BLOOD PRESSURE: 123 MMHG | WEIGHT: 182 LBS | BODY MASS INDEX: 33.49 KG/M2 | OXYGEN SATURATION: 98 % | HEIGHT: 62 IN

## 2023-01-01 RX ORDER — IBUPROFEN 600 MG/1
600 TABLET ORAL EVERY 6 HOURS PRN
Qty: 30 TABLET | Refills: 0 | Status: SHIPPED | OUTPATIENT
Start: 2023-01-01

## 2023-01-01 RX ADMIN — ACETAMINOPHEN 650 MG: 325 TABLET, FILM COATED ORAL at 03:51

## 2023-01-01 RX ADMIN — Medication 1 TABLET: at 08:41

## 2023-01-01 RX ADMIN — CETIRIZINE HYDROCHLORIDE 10 MG: 10 TABLET ORAL at 08:41

## 2023-01-01 RX ADMIN — TRIAMCINOLONE ACETONIDE 1 APPLICATION: 1 CREAM TOPICAL at 00:28

## 2023-01-01 RX ADMIN — ACETAMINOPHEN 650 MG: 325 TABLET, FILM COATED ORAL at 08:41

## 2023-01-01 RX ADMIN — IBUPROFEN 600 MG: 600 TABLET, FILM COATED ORAL at 00:24

## 2023-01-01 RX ADMIN — IBUPROFEN 600 MG: 600 TABLET, FILM COATED ORAL at 12:56

## 2023-01-01 RX ADMIN — IBUPROFEN 600 MG: 600 TABLET, FILM COATED ORAL at 06:27

## 2023-01-01 RX ADMIN — DOCUSATE SODIUM 50MG AND SENNOSIDES 8.6MG 1 TABLET: 8.6; 5 TABLET, FILM COATED ORAL at 08:41

## 2023-01-01 NOTE — PLAN OF CARE
Goal Outcome Evaluation:  Plan of Care Reviewed With: patient        Progress: improving  Outcome Evaluation: VSS.  Pt up ad killian and voiding without difficulty.  Fundal assessment and lochia, wnl.  Pain controlled w/scheduled motrin and tylenol.

## 2023-01-01 NOTE — LACTATION NOTE
Mom reports she has scabs on nipples. She has small scab on left nipple but approx 1//2 of right nipple is scabbed. RN ordered APNO for mom . Mom requesting nipple shield for temporary use until her nipples are healed. She is also pumping and just pumped 2 oz of breast milk. Educated on deep latching, nipple care, nipple shield and insurance pumping. Educated on baby's expected output and weight gain. Mom going home today. She has [facility] info for f/u    Lactation Consult Note    Evaluation Completed: 2023 10:06 EST  Patient Name: Starr Ramos  :  1980  MRN:  [id number]     REFERRAL  INFORMATION:                                         DELIVERY HISTORY:        Skin to skin initiation date/time:      Skin to skin end date/time:           MATERNAL ASSESSMENT:                               INFANT ASSESSMENT:  Information for the patient's :  Emani Ramos [[id number]]   No past medical history on file.                                                                                                     MATERNAL INFANT FEEDING:                                                                       EQUIPMENT TYPE:                                 BREAST PUMPING:          LACTATION REFERRALS:

## 2023-01-01 NOTE — DISCHARGE SUMMARY
[facility]   PROGRESS NOTE    Patient Name: Starr Ramos  :  1980  MRN:  [id number]      Post-Op Day 3 S/P   Subjective     Patient reports:    Doing well - ready for discharge. Pain well controlled. Tolerating po and   having flatus. Voiding and ambulating without difficulty. Lochia normal.     Objective       Vitals: Vital Signs Range for the last 24 hours  Temperature: Temp:  [96.9 °F (36.1 °C)-98.3 °F (36.8 °C)] 98.3 °F (36.8 °C)       BP: BP: (101-123)/(63-79) 123/79   Pulse: Heart Rate:  [65-89] 78                                                         Physical  Exam     Gen: Alert and awake  RRR  CTAB    Abdomen: Soft, ND,  Fundus firm with minimal tenderness    Incision : Intact, without erythema or exudate    Extremities: Calves NT bilaterally               Assessment & Plan ]   Assessment:  POD 3  -  Doing well. Stable for discharge.     Plan:    Instructions reviewed - no driving for 1 week and no narcotic pain meds.  no heavy lifting for 6  weeks, pelvic rest for 6 weeks.   Follow up in 6 weeks.   Rx on chart.         Pregnancy    History of  delivery, currently pregnant            Jaida Dey MD  2023  13:13 EST

## 2023-12-12 ENCOUNTER — OFFICE VISIT (OUTPATIENT)
Dept: FAMILY MEDICINE CLINIC | Facility: CLINIC | Age: 43
End: 2023-12-12
Payer: COMMERCIAL

## 2023-12-12 VITALS
WEIGHT: 163 LBS | RESPIRATION RATE: 16 BRPM | BODY MASS INDEX: 30 KG/M2 | DIASTOLIC BLOOD PRESSURE: 70 MMHG | OXYGEN SATURATION: 97 % | HEART RATE: 72 BPM | HEIGHT: 62 IN | SYSTOLIC BLOOD PRESSURE: 118 MMHG

## 2023-12-12 DIAGNOSIS — Z00.00 ANNUAL PHYSICAL EXAM: Primary | ICD-10-CM

## 2023-12-12 DIAGNOSIS — Z13.228 ENCOUNTER FOR SCREENING FOR OTHER METABOLIC DISORDERS: ICD-10-CM

## 2023-12-12 DIAGNOSIS — Z13.220 SCREENING FOR HYPERLIPIDEMIA: ICD-10-CM

## 2023-12-12 DIAGNOSIS — R79.89 ABNORMAL THYROID BLOOD TEST: ICD-10-CM

## 2023-12-12 PROCEDURE — 99396 PREV VISIT EST AGE 40-64: CPT | Performed by: NURSE PRACTITIONER

## 2023-12-12 NOTE — PATIENT INSTRUCTIONS
I will call call or send [employer] message with your lab results.   Please call with any questions or concerns.    Return in about 1 year (around 12/12/2024) for Annual, Labs.

## 2023-12-12 NOTE — PROGRESS NOTES
Preventive Exam    History of Present Illness: Starr Ramos is a 43 y.o. here for check up and review of routine health maintenance. she states she is doing well and has the below concerns:    Patient reports that she had labs drawn at a weight loss clinic and was told that her thyroid was low. She reports that she has fatigue and is unable to lose weight easily.     Past medical history, surgical history and family history have been reviewed.     Review of Systems   Constitutional:  Negative for appetite change, chills, fatigue and fever.   HENT:  Negative for congestion, dental problem, hearing loss, sinus pressure and sore throat.         Dental exam is due.    Eyes: Negative.  Negative for blurred vision, double vision, photophobia and visual disturbance.        Eye exam is due.    Respiratory:  Negative for cough, chest tightness, shortness of breath and wheezing.    Cardiovascular:  Negative for chest pain, palpitations and leg swelling.   Gastrointestinal:  Negative for abdominal pain, constipation, diarrhea, nausea and vomiting.   Endocrine: Negative.  Negative for cold intolerance and heat intolerance.   Genitourinary: Negative.  Negative for breast discharge, breast lump, breast pain, flank pain, menstrual problem, pelvic pain, pelvic pressure, vaginal bleeding and vaginal discharge.   Musculoskeletal:  Negative for arthralgias, back pain, joint swelling and myalgias.   Skin: Negative.    Allergic/Immunologic: Negative.  Negative for environmental allergies and food allergies.   Neurological:  Negative for dizziness, weakness, numbness and headache.   Hematological: Negative.  Does not bruise/bleed easily.   Psychiatric/Behavioral: Negative.  Negative for hallucinations, sleep disturbance, suicidal ideas, depressed mood and stress. The patient is not nervous/anxious.        PHYSICAL EXAM    Vitals:    12/12/23 0816   BP: 118/70   Pulse: 72   Resp: 16   SpO2: 97%       Body mass index is 29.81 kg/m².   BMI  is >= 25 and <30. (Overweight) The following options were offered after discussion;: exercise counseling/recommendations and nutrition counseling/recommendations       Physical Exam  Vitals and nursing note reviewed.   Constitutional:       Appearance: Normal appearance. She is well-developed and normal weight.   HENT:      Head: Normocephalic and atraumatic.      Right Ear: Tympanic membrane, ear canal and external ear normal.      Left Ear: Tympanic membrane, ear canal and external ear normal.      Nose: Nose normal.      Mouth/Throat:      Lips: Pink.      Mouth: Mucous membranes are moist.      Tongue: No lesions.      Palate: No mass and lesions.      Pharynx: Oropharynx is clear. Uvula midline.      Tonsils: No tonsillar exudate.   Eyes:      Conjunctiva/sclera: Conjunctivae normal.      Pupils: Pupils are equal, round, and reactive to light.   Neck:      Thyroid: No thyromegaly.   Cardiovascular:      Rate and Rhythm: Normal rate and regular rhythm.      Pulses: Normal pulses.           Dorsalis pedis pulses are 2+ on the right side and 2+ on the left side.        Posterior tibial pulses are 2+ on the right side and 2+ on the left side.      Heart sounds: Normal heart sounds. No murmur heard.  Pulmonary:      Effort: Pulmonary effort is normal.      Breath sounds: Normal breath sounds.   Abdominal:      General: Bowel sounds are normal. There is no distension.      Palpations: Abdomen is soft.      Tenderness: There is no abdominal tenderness.   Musculoskeletal:         General: No deformity. Normal range of motion.      Cervical back: Normal range of motion and neck supple.      Right lower leg: No edema.      Left lower leg: No edema.   Lymphadenopathy:      Head:      Right side of head: No submental, submandibular, tonsillar, preauricular, posterior auricular or occipital adenopathy.      Left side of head: No submental, submandibular, tonsillar, preauricular, posterior auricular or occipital adenopathy.       Cervical: No cervical adenopathy.      Right cervical: No superficial, deep or posterior cervical adenopathy.     Left cervical: No superficial, deep or posterior cervical adenopathy.      Upper Body:      Right upper body: No supraclavicular adenopathy.      Left upper body: No supraclavicular adenopathy.   Skin:     General: Skin is warm and dry.      Capillary Refill: Capillary refill takes 2 to 3 seconds.   Neurological:      General: No focal deficit present.      Mental Status: She is alert and oriented to person, place, and time.      Cranial Nerves: No cranial nerve deficit.      Sensory: Sensation is intact.      Motor: Motor function is intact.      Coordination: Coordination is intact.      Gait: Gait is intact.   Psychiatric:         Attention and Perception: Attention and perception normal.         Mood and Affect: Mood and affect normal.         Speech: Speech normal.         Behavior: Behavior normal. Behavior is cooperative.         Thought Content: Thought content normal.         Cognition and Memory: Cognition and memory normal.         Judgment: Judgment normal.         Procedures    Diagnoses and all orders for this visit:    1. Annual physical exam (Primary)    2. Screening for hyperlipidemia  -     Lipid Panel With / Chol / HDL Ratio    3. Encounter for screening for other metabolic disorders  -     CBC & Differential  -     Comprehensive Metabolic Panel  -     TSH  -     T3, Free  -     T4, Free  -     Thyroglobulin Antibody  -     Thyroid Peroxidase Antibody    4. Abnormal thyroid blood test  -     TSH  -     T3, Free  -     T4, Free  -     Thyroglobulin Antibody  -     Thyroid Peroxidase Antibody        Problems Addressed this Visit    None  Visit Diagnoses       Annual physical exam    -  Primary    Screening for hyperlipidemia        Relevant Orders    Lipid Panel With / Chol / HDL Ratio    Encounter for screening for other metabolic disorders        Relevant Orders    CBC &  Differential    Comprehensive Metabolic Panel    TSH    T3, Free    T4, Free    Thyroglobulin Antibody    Thyroid Peroxidase Antibody    Abnormal thyroid blood test        Relevant Orders    TSH    T3, Free    T4, Free    Thyroglobulin Antibody    Thyroid Peroxidase Antibody          Diagnoses         Codes Comments    Annual physical exam    -  Primary ICD-10-CM: Z00.00  ICD-9-CM: V70.0     Screening for hyperlipidemia     ICD-10-CM: Z13.220  ICD-9-CM: V77.91     Encounter for screening for other metabolic disorders     ICD-10-CM: Z13.228  ICD-9-CM: V77.99     Abnormal thyroid blood test     ICD-10-CM: R79.89  ICD-9-CM: 790.6           CBC  CMP  Thyroid panel   Lipid panel  Routine health maintenance reviewed and discussed with Starr Ramos.  Preventative counseling regarding healthy diet and exercise.   Pt reports that he wears a seatbelt regularly.   Return in about 1 year (around 12/12/2024) for Annual, Labs.

## 2023-12-13 DIAGNOSIS — R94.6 ABNORMAL THYROID FUNCTION TEST: Primary | ICD-10-CM

## 2023-12-13 LAB
ALBUMIN SERPL-MCNC: 4.8 G/DL (ref 3.5–5.2)
ALBUMIN/GLOB SERPL: 2.3 G/DL
ALP SERPL-CCNC: 54 U/L (ref 39–117)
ALT SERPL-CCNC: 10 U/L (ref 1–33)
AST SERPL-CCNC: 13 U/L (ref 1–32)
BASOPHILS # BLD MANUAL: 0.06 10*3/MM3 (ref 0–0.2)
BASOPHILS NFR BLD MANUAL: 1.1 % (ref 0–1.5)
BILIRUB SERPL-MCNC: 0.2 MG/DL (ref 0–1.2)
BUN SERPL-MCNC: 19 MG/DL (ref 6–20)
BUN/CREAT SERPL: 25 (ref 7–25)
CALCIUM SERPL-MCNC: 9.6 MG/DL (ref 8.6–10.5)
CHLORIDE SERPL-SCNC: 106 MMOL/L (ref 98–107)
CHOLEST SERPL-MCNC: 163 MG/DL (ref 0–200)
CHOLEST/HDLC SERPL: 3.79 {RATIO}
CO2 SERPL-SCNC: 22.6 MMOL/L (ref 22–29)
CREAT SERPL-MCNC: 0.76 MG/DL (ref 0.57–1)
DIFFERENTIAL COMMENT: ABNORMAL
EGFRCR SERPLBLD CKD-EPI 2021: 99.9 ML/MIN/1.73
EOSINOPHIL # BLD MANUAL: 0.39 10*3/MM3 (ref 0–0.4)
EOSINOPHIL NFR BLD MANUAL: 7.4 % (ref 0.3–6.2)
ERYTHROCYTE [DISTWIDTH] IN BLOOD BY AUTOMATED COUNT: 12.4 % (ref 12.3–15.4)
GLOBULIN SER CALC-MCNC: 2.1 GM/DL
GLUCOSE SERPL-MCNC: 75 MG/DL (ref 65–99)
HCT VFR BLD AUTO: 41.2 % (ref 34–46.6)
HDLC SERPL-MCNC: 43 MG/DL (ref 40–60)
HGB BLD-MCNC: 13.6 G/DL (ref 12–15.9)
LDLC SERPL CALC-MCNC: 102 MG/DL (ref 0–100)
LYMPHOCYTES # BLD MANUAL: 1.57 10*3/MM3 (ref 0.7–3.1)
LYMPHOCYTES NFR BLD MANUAL: 28.7 % (ref 19.6–45.3)
MCH RBC QN AUTO: 29.8 PG (ref 26.6–33)
MCHC RBC AUTO-ENTMCNC: 33 G/DL (ref 31.5–35.7)
MCV RBC AUTO: 90.4 FL (ref 79–97)
MONOCYTES # BLD MANUAL: 0.11 10*3/MM3 (ref 0.1–0.9)
MONOCYTES NFR BLD MANUAL: 2.1 % (ref 5–12)
NEUTROPHILS # BLD MANUAL: 3.15 10*3/MM3 (ref 1.7–7)
NEUTROPHILS NFR BLD MANUAL: 59.6 % (ref 42.7–76)
NRBC BLD AUTO-RTO: 0 /100 WBC (ref 0–0.2)
PLATELET # BLD AUTO: 215 10*3/MM3 (ref 140–450)
PLATELET BLD QL SMEAR: ABNORMAL
POTASSIUM SERPL-SCNC: 4.8 MMOL/L (ref 3.5–5.2)
PROT SERPL-MCNC: 6.9 G/DL (ref 6–8.5)
RBC # BLD AUTO: 4.56 10*6/MM3 (ref 3.77–5.28)
RBC MORPH BLD: ABNORMAL
SODIUM SERPL-SCNC: 140 MMOL/L (ref 136–145)
T3FREE SERPL-MCNC: 2.6 PG/ML (ref 2–4.4)
T4 FREE SERPL-MCNC: 0.98 NG/DL (ref 0.93–1.7)
THYROGLOB AB SERPL-ACNC: 6 IU/ML (ref 0–0.9)
THYROPEROXIDASE AB SERPL-ACNC: 179 IU/ML (ref 0–34)
TRIGL SERPL-MCNC: 95 MG/DL (ref 0–150)
TSH SERPL DL<=0.005 MIU/L-ACNC: 2.94 UIU/ML (ref 0.27–4.2)
VLDLC SERPL CALC-MCNC: 18 MG/DL (ref 5–40)
WBC # BLD AUTO: 5.28 10*3/MM3 (ref 3.4–10.8)

## 2023-12-22 ENCOUNTER — HOSPITAL ENCOUNTER (OUTPATIENT)
Facility: HOSPITAL | Age: 43
Discharge: HOME | End: 2023-12-22
Admitting: NURSE PRACTITIONER
Payer: COMMERCIAL

## 2023-12-22 DIAGNOSIS — R94.6 ABNORMAL THYROID FUNCTION TEST: ICD-10-CM

## 2023-12-22 PROCEDURE — 76536 US EXAM OF HEAD AND NECK: CPT

## 2023-12-29 DIAGNOSIS — E06.9 THYROIDITIS: Primary | ICD-10-CM

## 2023-12-29 DIAGNOSIS — R94.6 ABNORMAL THYROID FUNCTION TEST: ICD-10-CM

## 2023-12-29 DIAGNOSIS — E04.1 THYROID NODULE: ICD-10-CM

## 2024-01-05 ENCOUNTER — OFFICE VISIT (OUTPATIENT)
Dept: ENDOCRINOLOGY | Age: 44
End: 2024-01-05
Payer: COMMERCIAL

## 2024-01-05 VITALS
BODY MASS INDEX: 30.36 KG/M2 | DIASTOLIC BLOOD PRESSURE: 70 MMHG | OXYGEN SATURATION: 98 % | WEIGHT: 165 LBS | TEMPERATURE: 97.1 F | HEIGHT: 62 IN | SYSTOLIC BLOOD PRESSURE: 114 MMHG | HEART RATE: 73 BPM

## 2024-01-05 DIAGNOSIS — E04.1 THYROID NODULE: Primary | ICD-10-CM

## 2024-01-05 DIAGNOSIS — E55.9 VITAMIN D DEFICIENCY: ICD-10-CM

## 2024-01-05 DIAGNOSIS — R76.8 ANTI-TPO ANTIBODIES PRESENT: ICD-10-CM

## 2024-01-05 DIAGNOSIS — R79.89 HIGH SERUM LOW-DENSITY LIPOPROTEIN (LDL): ICD-10-CM

## 2024-01-05 NOTE — ASSESSMENT & PLAN NOTE
8 To 27% of  general population might have anti-TPO antibody without having thyroid disease  Recent TFT normal  Repeat TFT in 6 months

## 2024-01-05 NOTE — PROGRESS NOTES
Chief Complaint  Abnormal thyroid function test, Hashimoto's Thyroiditis, and Thyroid nodule (Pt states that energy levels have been low, weight is stable, no family hx of thyroid disease, does have a regular menstrual cycle. )    Subjective        Starr Ramos presents to [facility]  to establish care.       History of Present Illness      Patient states  she has started taking Wegovy for weight loss, as a part of workup had a TFT checked which showed positive TPO and thyroglobulin antibody.    She also had a thyroid ultrasound done which showed isthmic nodule: Wider than tall  , TI-RADS 4, no microcalcification: FNA not required based on size  She took  Synthroid during the first pregnancy in 2018 and after that Synthroid was discontinued as  TFT was normal.    4 pregnancies , had 1 miscarriage    Thyroid nodule    She also had a thyroid ultrasound done which showed isthmic nodule: Wider than tall  , TI-RADS 4, no microcalcification: FNA not required based on size   Neck swelling: Denies   Obstructive symptoms: Denies difficulty swallowing/pain with swallowing /hoarseness of voice.   Family history of thyroid cancer: Denies   Personal history of radiation exposure: Denies    TFT on 12/12/2023  TSH 2.94  Free T40.98  Free T32.6  Thyroglobulin antibody and TPO antibody positive  Had tubal ligation       Thyroid ultrasound in December 2023: The right thyroid lobe measures 5 x 2.3 x 2.5 cm, and the left  thyroid lobe measures 4.6 x 1.8 x 1.7 cm. The thyroid gland is  heterogeneous in appearance. A solid appearing hypoechoic well  marginated wider than tall isthmic thyroid nodule without  microcalcification measures 1.2 x 1 x 0.6 cm. There are normal size  lymph nodes in the neck.     IMPRESSION:  1. Isthmic nodule, TI-RADS-4. Consider 1 year follow-up if  clinically indicated     2.. Suggestive of thyroiditis.      Recent lipid profile     Triglyceride 95  Denies family history  "of premature coronary artery disease          Objective   Vital Signs:  /70   Pulse 73   Temp 97.1 °F (36.2 °C) (Temporal)   Ht 157.5 cm (62.01\")   Wt 74.8 kg (165 lb)   SpO2 98%   BMI 30.17 kg/m²   Estimated body mass index is 30.17 kg/m² as calculated from the following:    Height as of this encounter: 157.5 cm (62.01\").    Weight as of this encounter: 74.8 kg (165 lb).               Review of Systems   Constitutional:  Positive for fatigue. Negative for unexpected weight change.        Physical Exam  Constitutional:       Appearance: Normal appearance.   HENT:      Head: Normocephalic and atraumatic.   Eyes:      Extraocular Movements: Extraocular movements intact.   Cardiovascular:      Rate and Rhythm: Normal rate and regular rhythm.   Pulmonary:      Effort: Pulmonary effort is normal.      Breath sounds: Normal breath sounds. No wheezing.   Abdominal:      Palpations: Abdomen is soft.      Tenderness: There is no abdominal tenderness.   Musculoskeletal:         General: No swelling. Normal range of motion.      Cervical back: Neck supple. No tenderness.   Neurological:      Mental Status: She is alert and oriented to person, place, and time.   Psychiatric:         Mood and Affect: Mood normal.        Result Review :  The following data was reviewed by: Mahrokh Nokhbehzaeim, MD on 01/05/2024:  CMP          12/12/2023    08:46   CMP   Glucose 75    BUN 19    Creatinine 0.76    Sodium 140    Potassium 4.8    Chloride 106    Calcium 9.6    Total Protein 6.9    Albumin 4.8    Globulin 2.1    Total Bilirubin 0.2    Alkaline Phosphatase 54    AST (SGOT) 13    ALT (SGPT) 10    BUN/Creatinine Ratio 25.0      Lipid Panel          12/12/2023    08:46   Lipid Panel   Total Cholesterol 163    Triglycerides 95    HDL Cholesterol 43    VLDL Cholesterol 18    LDL Cholesterol  102      TSH          12/12/2023    08:46   TSH   TSH 2.940        Data reviewed : Radiologic studies thyroid ultrasound           "   Assessment and Plan   Diagnoses and all orders for this visit:    1. Thyroid nodule (Primary)  Assessment & Plan:  No compressive symptoms  Denies radiation exposure  Will repeat thyroid ultrasound in 1 year    Orders:  -     US Thyroid; Future    2. Anti-TPO antibodies present  Assessment & Plan:  8 To 27% of  general population might have anti-TPO antibody without having thyroid disease  Recent TFT normal  Repeat TFT in 6 months      3. Vitamin D deficiency  Assessment & Plan:  Will check vitamin D level today    Orders:  -     Vitamin D 25 Hydroxy    4. High serum low-density lipoprotein (LDL)  Assessment & Plan:  LDL < 190  No history of diabetes   No family history of premature coronary artery disease  Emphasized on the importance of diet and daily exercise  No medical treatment needed  But continue to follow             I spent 45 minutes caring for Starr on this date of service. This time includes time spent by me in the following activities:preparing for the visit, reviewing tests, obtaining and/or reviewing a separately obtained history, performing a medically appropriate examination and/or evaluation , counseling and educating the patient/family/caregiver, ordering medications, tests, or procedures, and documenting information in the medical record  Follow Up   Return in about 6 months (around 7/5/2024).  Patient was given instructions and counseling regarding her condition or for health maintenance advice. Please see specific information pulled into the AVS if appropriate.

## 2024-01-05 NOTE — ASSESSMENT & PLAN NOTE
LDL < 190  No history of diabetes   No family history of premature coronary artery disease  Emphasized on the importance of diet and daily exercise  No medical treatment needed  But continue to follow

## 2024-01-06 LAB — 25(OH)D3+25(OH)D2 SERPL-MCNC: 28.8 NG/ML (ref 30–100)

## 2024-07-05 ENCOUNTER — OFFICE VISIT (OUTPATIENT)
Dept: ENDOCRINOLOGY | Age: 44
End: 2024-07-05
Payer: COMMERCIAL

## 2024-07-05 VITALS
BODY MASS INDEX: 31.73 KG/M2 | TEMPERATURE: 96.7 F | WEIGHT: 172.4 LBS | HEART RATE: 74 BPM | SYSTOLIC BLOOD PRESSURE: 120 MMHG | HEIGHT: 62 IN | DIASTOLIC BLOOD PRESSURE: 80 MMHG | OXYGEN SATURATION: 99 %

## 2024-07-05 DIAGNOSIS — R76.8 ANTI-TPO ANTIBODIES PRESENT: ICD-10-CM

## 2024-07-05 DIAGNOSIS — E55.9 VITAMIN D DEFICIENCY: ICD-10-CM

## 2024-07-05 DIAGNOSIS — E66.09 CLASS 1 OBESITY DUE TO EXCESS CALORIES WITHOUT SERIOUS COMORBIDITY WITH BODY MASS INDEX (BMI) OF 31.0 TO 31.9 IN ADULT: ICD-10-CM

## 2024-07-05 DIAGNOSIS — E04.1 THYROID NODULE: Primary | ICD-10-CM

## 2024-07-05 DIAGNOSIS — R79.89 HIGH SERUM LOW-DENSITY LIPOPROTEIN (LDL): ICD-10-CM

## 2024-07-05 PROBLEM — E66.811 CLASS 1 OBESITY DUE TO EXCESS CALORIES WITHOUT SERIOUS COMORBIDITY WITH BODY MASS INDEX (BMI) OF 31.0 TO 31.9 IN ADULT: Status: ACTIVE | Noted: 2024-07-05

## 2024-07-05 LAB
25(OH)D3+25(OH)D2 SERPL-MCNC: 24.6 NG/ML (ref 30–100)
ALBUMIN SERPL-MCNC: 4.3 G/DL (ref 3.5–5.2)
ALBUMIN/GLOB SERPL: 2.2 G/DL
ALP SERPL-CCNC: 58 U/L (ref 39–117)
ALT SERPL-CCNC: 13 U/L (ref 1–33)
AST SERPL-CCNC: 15 U/L (ref 1–32)
BILIRUB SERPL-MCNC: 0.2 MG/DL (ref 0–1.2)
BUN SERPL-MCNC: 12 MG/DL (ref 6–20)
BUN/CREAT SERPL: 17.6 (ref 7–25)
CALCIUM SERPL-MCNC: 9.2 MG/DL (ref 8.6–10.5)
CHLORIDE SERPL-SCNC: 108 MMOL/L (ref 98–107)
CHOLEST SERPL-MCNC: 175 MG/DL (ref 0–200)
CO2 SERPL-SCNC: 25 MMOL/L (ref 22–29)
CREAT SERPL-MCNC: 0.68 MG/DL (ref 0.57–1)
EGFRCR SERPLBLD CKD-EPI 2021: 111 ML/MIN/1.73
GLOBULIN SER CALC-MCNC: 2 GM/DL
GLUCOSE SERPL-MCNC: 94 MG/DL (ref 65–99)
HDLC SERPL-MCNC: 49 MG/DL (ref 40–60)
IMP & REVIEW OF LAB RESULTS: NORMAL
LDLC SERPL CALC-MCNC: 102 MG/DL (ref 0–100)
POTASSIUM SERPL-SCNC: 5.3 MMOL/L (ref 3.5–5.2)
PROT SERPL-MCNC: 6.3 G/DL (ref 6–8.5)
SODIUM SERPL-SCNC: 140 MMOL/L (ref 136–145)
T4 FREE SERPL-MCNC: 0.86 NG/DL (ref 0.92–1.68)
TRIGL SERPL-MCNC: 134 MG/DL (ref 0–150)
TSH SERPL DL<=0.005 MIU/L-ACNC: 4.9 UIU/ML (ref 0.27–4.2)
VLDLC SERPL CALC-MCNC: 24 MG/DL (ref 5–40)

## 2024-07-05 RX ORDER — PHENTERMINE HYDROCHLORIDE 37.5 MG/1
37.5 TABLET ORAL
Qty: 90 TABLET | Refills: 0 | Status: SHIPPED | OUTPATIENT
Start: 2024-07-05

## 2024-07-05 NOTE — ASSESSMENT & PLAN NOTE
Patient's (Body mass index is 31.52 kg/m².) indicates that they are obese (BMI >30) with health conditions that include  fatigue  . We discussed portion control, increasing exercise, and pharmacologic options including Adipex .   Start Adipex  Side effects and benefits discussed with patient  Instructed to inform me if experiencing any side effects

## 2024-07-05 NOTE — ASSESSMENT & PLAN NOTE
8 To 27% of  general population might have anti-TPO antibody without having thyroid disease  Repeat TFT today

## 2024-07-05 NOTE — ASSESSMENT & PLAN NOTE
Addended by: Sparkle Cadena on: 8/10/2022 02:17 PM     Modules accepted: Orders LDL < 190  No history of diabetes   No family history of premature coronary artery disease  Emphasized on the importance of diet and daily exercise  Repeat lipid profile today

## 2024-07-05 NOTE — PROGRESS NOTES
"Chief Complaint  thyroid nodule    Subjective        Starr Ramos presents to [facility] for follow up.       History of Present Illness    She is here for follow-up on thyroid nodule and positive TPO antibody.        Thyroid nodule    Denies radiation exposure  Denies neck pain or difficulty swallowing  Denies family history of thyroid cancer  Thyroid ultrasound in December 2023 showed: 1. Isthmic nodule, TI-RADS-4. Consider 1 year follow-up if  clinically indicated  2.Suggestive of thyroiditis.    Complains of weight gain and fatigue    Takes vitamin D 2000 units daily.              Component      Latest Ref Rng 12/12/2023 1/5/2024   TSH Baseline      0.270 - 4.200 uIU/mL 2.940     T3, Free      2.0 - 4.4 pg/mL 2.6     Free T4      0.93 - 1.70 ng/dL 0.98     Thyroglobulin Ab      0.0 - 0.9 IU/mL 6.0 (H)     Thyroid Peroxidase Antibody      0 - 34 IU/mL 179 (H)     25 Hydroxy, Vitamin D      30.0 - 100.0 ng/ml  28.8 (L)       Legend:  (H) High  (L) Low      Objective   Vital Signs:  /80   Pulse 74   Temp 96.7 °F (35.9 °C) (Temporal)   Ht 157.5 cm (62.01\")   Wt 78.2 kg (172 lb 6.4 oz)   SpO2 99%   BMI 31.52 kg/m²   Estimated body mass index is 31.52 kg/m² as calculated from the following:    Height as of this encounter: 157.5 cm (62.01\").    Weight as of this encounter: 78.2 kg (172 lb 6.4 oz).                 Physical Exam  Constitutional:       Appearance: She is obese.   HENT:      Head: Normocephalic and atraumatic.   Eyes:      Extraocular Movements: Extraocular movements intact.   Cardiovascular:      Rate and Rhythm: Normal rate and regular rhythm.   Pulmonary:      Effort: Pulmonary effort is normal.      Breath sounds: Normal breath sounds. No wheezing.   Abdominal:      Palpations: Abdomen is soft.      Tenderness: There is no abdominal tenderness.   Musculoskeletal:         General: No swelling. Normal range of motion.      Cervical back: Neck supple. No " tenderness.   Neurological:      Mental Status: She is alert and oriented to person, place, and time.   Psychiatric:         Mood and Affect: Mood normal.        Result Review :  The following data was reviewed by: Mahrokh Nokhbehzaeim, MD on 07/05/2024:  CMP          12/12/2023    08:46   CMP   Glucose 75    BUN 19    Creatinine 0.76    Sodium 140    Potassium 4.8    Chloride 106    Calcium 9.6    Total Protein 6.9    Albumin 4.8    Globulin 2.1    Total Bilirubin 0.2    Alkaline Phosphatase 54    AST (SGOT) 13    ALT (SGPT) 10    BUN/Creatinine Ratio 25.0      CMP          12/12/2023    08:46   CMP   Glucose 75    BUN 19    Creatinine 0.76    Sodium 140    Potassium 4.8    Chloride 106    Calcium 9.6    Total Protein 6.9    Albumin 4.8    Globulin 2.1    Total Bilirubin 0.2    Alkaline Phosphatase 54    AST (SGOT) 13    ALT (SGPT) 10    BUN/Creatinine Ratio 25.0       Lipid Panel          12/12/2023    08:46   Lipid Panel   Total Cholesterol 163    Triglycerides 95    HDL Cholesterol 43    VLDL Cholesterol 18    LDL Cholesterol  102          TSH          12/12/2023    08:46   TSH   TSH 2.940       Free T4   Date Value Ref Range Status   12/12/2023 0.98 0.93 - 1.70 ng/dL Final     Comment:     Results may be falsely increased if patient taking Biotin.      Thyroglobulin Ab   Date Value Ref Range Status   12/12/2023 6.0 (H) 0.0 - 0.9 IU/mL Final     Comment:     Thyroglobulin Antibody measured by [employer]      Thyroid Peroxidase Antibody   Date Value Ref Range Status   12/12/2023 179 (H) 0 - 34 IU/mL Final      Data reviewed : Radiologic studies Thyroid us              Assessment and Plan   Diagnoses and all orders for this visit:    1. Thyroid nodule (Primary)  Assessment & Plan:  Repeat thyroid ultrasound in December 2024    Orders:  -     US Thyroid; Future    2. Anti-TPO antibodies present  Assessment & Plan:  8 To 27% of  general population might have anti-TPO antibody without having thyroid  disease  Repeat TFT today    Orders:  -     Comprehensive Metabolic Panel  -     TSH  -     T4, Free    3. Class 1 obesity due to excess calories without serious comorbidity with body mass index (BMI) of 31.0 to 31.9 in adult  Assessment & Plan:  Patient's (Body mass index is 31.52 kg/m².) indicates that they are obese (BMI >30) with health conditions that include  fatigue  . We discussed portion control, increasing exercise, and pharmacologic options including Adipex .   Start Adipex  Side effects and benefits discussed with patient  Instructed to inform me if experiencing any side effects    Orders:  -     phentermine (Adipex-P) 37.5 MG tablet; Take 1 tablet by mouth Every Morning Before Breakfast.  Dispense: 90 tablet; Refill: 0    4. Vitamin D deficiency  Assessment & Plan:  Recheck vitamin D level today    Orders:  -     Vitamin D,25-Hydroxy    5. High serum low-density lipoprotein (LDL)  Assessment & Plan:  LDL < 190  No history of diabetes   No family history of premature coronary artery disease  Emphasized on the importance of diet and daily exercise  Repeat lipid profile today    Orders:  -     Lipid Panel             Follow Up   Return in about 3 months (around 10/5/2024).  Patient was given instructions and counseling regarding her condition or for health maintenance advice. Please see specific information pulled into the AVS if appropriate.

## 2024-07-08 ENCOUNTER — TELEPHONE (OUTPATIENT)
Dept: ENDOCRINOLOGY | Age: 44
End: 2024-07-08
Payer: COMMERCIAL

## 2024-07-08 DIAGNOSIS — E06.3 HASHIMOTO'S THYROIDITIS: Primary | ICD-10-CM

## 2024-07-08 DIAGNOSIS — E87.5 HYPERKALEMIA: ICD-10-CM

## 2024-07-08 RX ORDER — LEVOTHYROXINE SODIUM 0.03 MG/1
25 TABLET ORAL
Qty: 90 TABLET | Refills: 1 | Status: SHIPPED | OUTPATIENT
Start: 2024-07-08

## 2024-07-08 NOTE — TELEPHONE ENCOUNTER
Called and discussed the results  TSH is high and free T4 is low, will start levothyroxine 25 mcg daily  Instructed to take the pill early morning on empty stomach with only water 30 to 45 minutes before breakfast  Will repeat TFT in 6 weeks  Potassium is 5.3  Does not take any medication which could affect the potassium level  Instructed to watch her diet  Instructed to go to ER if experiencing lightheadedness, dizziness, palpitation or chest pain  Will repeat CMP in 6 weeks and forward the result to her PCP  Vitamin D level is low, currently takes vitamin D 2000 units daily  Instructed to increase the dose of vitamin D supplements to 4000 units daily

## 2024-09-11 LAB
CHOLEST SERPL-MCNC: 182 MG/DL (ref 100–199)
HDLC SERPL-MCNC: 50 MG/DL
IMP & REVIEW OF LAB RESULTS: NORMAL
LDLC SERPL CALC-MCNC: 113 MG/DL (ref 0–99)
TRIGL SERPL-MCNC: 104 MG/DL (ref 0–149)
VLDLC SERPL CALC-MCNC: 19 MG/DL (ref 5–40)

## 2024-10-08 ENCOUNTER — OFFICE VISIT (OUTPATIENT)
Dept: ENDOCRINOLOGY | Age: 44
End: 2024-10-08
Payer: COMMERCIAL

## 2024-10-08 VITALS
OXYGEN SATURATION: 99 % | TEMPERATURE: 96.4 F | HEART RATE: 76 BPM | DIASTOLIC BLOOD PRESSURE: 82 MMHG | WEIGHT: 158.2 LBS | SYSTOLIC BLOOD PRESSURE: 112 MMHG | BODY MASS INDEX: 28.93 KG/M2

## 2024-10-08 DIAGNOSIS — E66.811 CLASS 1 OBESITY DUE TO EXCESS CALORIES WITHOUT SERIOUS COMORBIDITY WITH BODY MASS INDEX (BMI) OF 31.0 TO 31.9 IN ADULT: ICD-10-CM

## 2024-10-08 DIAGNOSIS — E55.9 VITAMIN D DEFICIENCY: ICD-10-CM

## 2024-10-08 DIAGNOSIS — E04.1 THYROID NODULE: ICD-10-CM

## 2024-10-08 DIAGNOSIS — R79.89 HIGH SERUM LOW-DENSITY LIPOPROTEIN (LDL): ICD-10-CM

## 2024-10-08 DIAGNOSIS — E06.3 HASHIMOTO'S THYROIDITIS: Primary | ICD-10-CM

## 2024-10-08 DIAGNOSIS — E66.09 CLASS 1 OBESITY DUE TO EXCESS CALORIES WITHOUT SERIOUS COMORBIDITY WITH BODY MASS INDEX (BMI) OF 31.0 TO 31.9 IN ADULT: ICD-10-CM

## 2024-10-08 PROCEDURE — 99214 OFFICE O/P EST MOD 30 MIN: CPT | Performed by: STUDENT IN AN ORGANIZED HEALTH CARE EDUCATION/TRAINING PROGRAM

## 2024-10-08 RX ORDER — PHENTERMINE HYDROCHLORIDE 37.5 MG/1
37.5 TABLET ORAL
Qty: 90 TABLET | Refills: 0 | Status: SHIPPED | OUTPATIENT
Start: 2024-10-08

## 2024-10-08 NOTE — PROGRESS NOTES
Chief Complaint  Thyroid nodule    Subjective        Starr Ramos presents to [facility] ENDOCRINOLOGY for follow up.       History of Present Illness      Thyroid nodule     Denies radiation exposure  Denies neck pain or difficulty swallowing  Denies family history of thyroid cancer  Thyroid ultrasound in December 2023 showed: 1. Isthmic nodule, TI-RADS-4. Consider 1 year follow-up if  clinically indicated  2.Suggestive of thyroiditis.    Hashimoto's thyroiditis  Takes levothyroxine 25 mcg daily     Takes vitamin D 2000 units daily.    Takes Adipex for weight loss: Tolerating it well  She is happy with the result  Reports improvement in her energy level.  S/p  tubal ligation              Component      Latest Ref Rng 9/10/2024   Glucose      70 - 99 mg/dL 91    BUN      6 - 24 mg/dL 11    Creatinine      0.57 - 1.00 mg/dL 0.73    EGFR Result      >59 mL/min/1.73 105    BUN/Creatinine Ratio      9 - 23  15    Sodium      134 - 144 mmol/L 139    Potassium      3.5 - 5.2 mmol/L 4.2    Chloride      96 - 106 mmol/L 103    CO2      20 - 29 mmol/L 19 (L)    Calcium      8.7 - 10.2 mg/dL 9.6    Total Protein      6.0 - 8.5 g/dL 7.2    Albumin      3.9 - 4.9 g/dL 4.7    Globulin      1.5 - 4.5 g/dL 2.5    Total Bilirubin      0.0 - 1.2 mg/dL 0.3    Alkaline Phosphatase      44 - 121 IU/L 50    AST (SGOT)      0 - 40 IU/L 14    ALT (SGPT)      0 - 32 IU/L 12    Total Cholesterol      100 - 199 mg/dL 182    Triglycerides      0 - 149 mg/dL 104    HDL Cholesterol      >39 mg/dL 50    VLDL Cholesterol Kilo      5 - 40 mg/dL 19    LDL Cholesterol       0 - 99 mg/dL 113 (H)    TSH Baseline      0.450 - 4.500 uIU/mL 2.600    Free T4      0.82 - 1.77 ng/dL 1.11    Interpretation Note       Legend:  (L) Low  (H) High    Objective   Vital Signs:  /82   Pulse 76   Temp 96.4 °F (35.8 °C) (Temporal)   Wt 71.8 kg (158 lb 3.2 oz)   SpO2 99%   BMI 28.93 kg/m²   Estimated body mass index is 28.93 kg/m² as  "calculated from the following:    Height as of 7/5/24: 157.5 cm (62.01\").    Weight as of this encounter: 71.8 kg (158 lb 3.2 oz).                   Physical Exam  Constitutional:       Appearance: Normal appearance.   HENT:      Head: Normocephalic.   Eyes:      Extraocular Movements: Extraocular movements intact.   Cardiovascular:      Rate and Rhythm: Normal rate.   Pulmonary:      Effort: Pulmonary effort is normal.      Breath sounds: No wheezing.   Abdominal:      Palpations: Abdomen is soft.      Tenderness: There is no abdominal tenderness.   Musculoskeletal:         General: No swelling.      Cervical back: Neck supple. No tenderness.   Skin:     General: Skin is dry.   Neurological:      Mental Status: She is alert and oriented to person, place, and time.      Comments: No tremor   Psychiatric:         Mood and Affect: Mood normal.        Result Review :  The following data was reviewed by: Mahrokh Nokhbehzaeim, MD on 10/08/2024:  [facility]          12/12/2023    08:46 7/5/2024    09:00 9/10/2024    12:22   CMP   Glucose 75  94  91    BUN 19  12  11    Creatinine 0.76  0.68  0.73    Sodium 140  140  139    Potassium 4.8  5.3  4.2    Chloride 106  108  103    Calcium 9.6  9.2  9.6    Total Protein 6.9  6.3  7.2    Albumin 4.8  4.3  4.7    Globulin 2.1  2.0  2.5    Total Bilirubin 0.2  0.2  0.3    Alkaline Phosphatase 54  58  50    AST (SGOT) 13  15  14    ALT (SGPT) 10  13  12    BUN/Creatinine Ratio 25.0  17.6  15      CMP          12/12/2023    08:46 7/5/2024    09:00 9/10/2024    12:22   CMP   Glucose 75  94  91    BUN 19  12  11    Creatinine 0.76  0.68  0.73    Sodium 140  140  139    Potassium 4.8  5.3  4.2    Chloride 106  108  103    Calcium 9.6  9.2  9.6    Total Protein 6.9  6.3  7.2    Albumin 4.8  4.3  4.7    Globulin 2.1  2.0  2.5    Total Bilirubin 0.2  0.2  0.3    Alkaline Phosphatase 54  58  50    AST (SGOT) 13  15  14    ALT (SGPT) 10  13  12    BUN/Creatinine Ratio 25.0  17.6  15       Lipid " Panel          12/12/2023    08:46 7/5/2024    09:00 9/10/2024    12:24   Lipid Panel   Total Cholesterol 163  175  182    Triglycerides 95  134  104    HDL Cholesterol 43  49  50    VLDL Cholesterol 18  24  19    LDL Cholesterol  102  102  113       Free T4   Date Value Ref Range Status   09/10/2024 1.11 0.82 - 1.77 ng/dL Final      25 Hydroxy, Vitamin D   Date Value Ref Range Status   07/05/2024 24.6 (L) 30.0 - 100.0 ng/ml Final     Comment:     Reference Range for Total Vitamin D 25(OH)  Deficiency <20.0 ng/mL  Insufficiency 21-29 ng/mL  Sufficiency  ng/mL  Toxicity >100 ng/ml        Thyroglobulin Ab   Date Value Ref Range Status   12/12/2023 6.0 (H) 0.0 - 0.9 IU/mL Final     Comment:     Thyroglobulin Antibody measured by [employer]      Thyroid Peroxidase Antibody   Date Value Ref Range Status   12/12/2023 179 (H) 0 - 34 IU/mL Final      Data reviewed : Radiologic studies thyroid us             Assessment and Plan   Diagnoses and all orders for this visit:    1. Hashimoto's thyroiditis (Primary)  Assessment & Plan:  P TFT within the normal range  Continue the current dose of levothyroxine 25 mcg daily  Repeat labs before next visit    Orders:  -     Comprehensive Metabolic Panel; Future  -     TSH; Future  -     T4, Free; Future    2. Thyroid nodule  Assessment & Plan:  Repeat thyroid ultrasound in December 2024    Orders:  -     US Thyroid; Future    3. Vitamin D deficiency  -     Vitamin D,25-Hydroxy; Future    4. Class 1 obesity due to excess calories without serious comorbidity with body mass index (BMI) of 31.0 to 31.9 in adult  Assessment & Plan:  Continue Adipex  We discussed portion control, increasing exercise, and pharmacologic options including Adipex .   Instructed to drink enough water and stay hydrated    Orders:  -     phentermine (Adipex-P) 37.5 MG tablet; Take 1 tablet by mouth Every Morning Before Breakfast.  Dispense: 90 tablet; Refill: 0    5. High serum low-density  lipoprotein (LDL)  Assessment & Plan:  No personal history of diabetes or heart disease  No family history of premature coronary artery disease  Cardiac scan ordered    Orders:  -     CT cardiac calcium score wo dye; Future             Follow Up   Return in about 3 months (around 1/8/2025).  Patient was given instructions and counseling regarding her condition or for health maintenance advice. Please see specific information pulled into the AVS if appropriate.

## 2024-10-08 NOTE — ASSESSMENT & PLAN NOTE
Continue Adipex  We discussed portion control, increasing exercise, and pharmacologic options including Adipex .   Instructed to drink enough water and stay hydrated

## 2024-10-08 NOTE — ASSESSMENT & PLAN NOTE
No personal history of diabetes or heart disease  No family history of premature coronary artery disease  Cardiac scan ordered

## 2024-10-08 NOTE — ASSESSMENT & PLAN NOTE
P TFT within the normal range  Continue the current dose of levothyroxine 25 mcg daily  Repeat labs before next visit

## 2024-11-03 ENCOUNTER — HOSPITAL ENCOUNTER (OUTPATIENT)
Facility: HOSPITAL | Age: 44
Discharge: HOME | End: 2024-11-03
Admitting: STUDENT IN AN ORGANIZED HEALTH CARE EDUCATION/TRAINING PROGRAM

## 2024-11-03 DIAGNOSIS — R79.89 HIGH SERUM LOW-DENSITY LIPOPROTEIN (LDL): ICD-10-CM

## 2024-11-03 PROCEDURE — 75571 CT HRT W/O DYE W/CA TEST: CPT

## 2024-11-19 ENCOUNTER — APPOINTMENT (OUTPATIENT)
Dept: WOMENS IMAGING | Facility: HOSPITAL | Age: 44
End: 2024-11-19
Payer: COMMERCIAL

## 2024-11-19 PROCEDURE — 76642 ULTRASOUND BREAST LIMITED: CPT | Performed by: RADIOLOGY

## 2024-12-05 ENCOUNTER — HOSPITAL ENCOUNTER (OUTPATIENT)
Facility: HOSPITAL | Age: 44
Discharge: HOME | End: 2024-12-05
Admitting: STUDENT IN AN ORGANIZED HEALTH CARE EDUCATION/TRAINING PROGRAM
Payer: COMMERCIAL

## 2024-12-05 DIAGNOSIS — E04.1 THYROID NODULE: ICD-10-CM

## 2024-12-05 PROCEDURE — 76536 US EXAM OF HEAD AND NECK: CPT

## 2025-01-23 ENCOUNTER — TELEPHONE (OUTPATIENT)
Dept: ENDOCRINOLOGY | Age: 45
End: 2025-01-23
Payer: COMMERCIAL

## 2025-01-23 DIAGNOSIS — E55.9 VITAMIN D DEFICIENCY: ICD-10-CM

## 2025-01-23 DIAGNOSIS — E06.3 HASHIMOTO'S THYROIDITIS: ICD-10-CM

## 2025-01-23 NOTE — TELEPHONE ENCOUNTER
Called and left message for patient to see if she would like to reschedule a follow up appointment.     If so, patient will need to schedule labs a week prior to a follow up.

## 2025-01-28 LAB
25(OH)D3+25(OH)D2 SERPL-MCNC: 26.6 NG/ML (ref 30–100)
ALBUMIN SERPL-MCNC: 4.4 G/DL (ref 3.5–5.2)
ALBUMIN/GLOB SERPL: 1.6 G/DL
ALP SERPL-CCNC: 69 U/L (ref 39–117)
ALT SERPL-CCNC: 10 U/L (ref 1–33)
AST SERPL-CCNC: 12 U/L (ref 1–32)
BILIRUB SERPL-MCNC: <0.2 MG/DL (ref 0–1.2)
BUN SERPL-MCNC: 12 MG/DL (ref 6–20)
BUN/CREAT SERPL: 15.4 (ref 7–25)
CALCIUM SERPL-MCNC: 9.7 MG/DL (ref 8.6–10.5)
CHLORIDE SERPL-SCNC: 104 MMOL/L (ref 98–107)
CO2 SERPL-SCNC: 25.1 MMOL/L (ref 22–29)
CREAT SERPL-MCNC: 0.78 MG/DL (ref 0.57–1)
EGFRCR SERPLBLD CKD-EPI 2021: 96.2 ML/MIN/1.73
GLOBULIN SER CALC-MCNC: 2.7 GM/DL
GLUCOSE SERPL-MCNC: 96 MG/DL (ref 65–99)
POTASSIUM SERPL-SCNC: 4.3 MMOL/L (ref 3.5–5.2)
PROT SERPL-MCNC: 7.1 G/DL (ref 6–8.5)
SODIUM SERPL-SCNC: 140 MMOL/L (ref 136–145)
T4 FREE SERPL-MCNC: 1.09 NG/DL (ref 0.92–1.68)
TSH SERPL DL<=0.005 MIU/L-ACNC: 4.19 UIU/ML (ref 0.27–4.2)

## 2025-02-03 ENCOUNTER — TELEPHONE (OUTPATIENT)
Dept: ENDOCRINOLOGY | Age: 45
End: 2025-02-03
Payer: COMMERCIAL

## 2025-02-04 ENCOUNTER — OFFICE VISIT (OUTPATIENT)
Dept: ENDOCRINOLOGY | Age: 45
End: 2025-02-04
Payer: COMMERCIAL

## 2025-02-04 VITALS
WEIGHT: 156.4 LBS | HEART RATE: 82 BPM | DIASTOLIC BLOOD PRESSURE: 90 MMHG | TEMPERATURE: 97.1 F | BODY MASS INDEX: 28.6 KG/M2 | SYSTOLIC BLOOD PRESSURE: 138 MMHG | OXYGEN SATURATION: 99 %

## 2025-02-04 DIAGNOSIS — E04.1 THYROID NODULE: ICD-10-CM

## 2025-02-04 DIAGNOSIS — E66.811 CLASS 1 OBESITY DUE TO EXCESS CALORIES WITHOUT SERIOUS COMORBIDITY WITH BODY MASS INDEX (BMI) OF 31.0 TO 31.9 IN ADULT: ICD-10-CM

## 2025-02-04 DIAGNOSIS — E06.3 HASHIMOTO'S THYROIDITIS: Primary | ICD-10-CM

## 2025-02-04 DIAGNOSIS — E55.9 VITAMIN D DEFICIENCY: ICD-10-CM

## 2025-02-04 DIAGNOSIS — E66.09 CLASS 1 OBESITY DUE TO EXCESS CALORIES WITHOUT SERIOUS COMORBIDITY WITH BODY MASS INDEX (BMI) OF 31.0 TO 31.9 IN ADULT: ICD-10-CM

## 2025-02-04 PROCEDURE — 99214 OFFICE O/P EST MOD 30 MIN: CPT | Performed by: STUDENT IN AN ORGANIZED HEALTH CARE EDUCATION/TRAINING PROGRAM

## 2025-02-04 RX ORDER — LEVOTHYROXINE SODIUM 25 UG/1
25 TABLET ORAL
Qty: 90 TABLET | Refills: 1 | Status: SHIPPED | OUTPATIENT
Start: 2025-02-04

## 2025-02-04 NOTE — ASSESSMENT & PLAN NOTE
Recent TFT within the normal range  Continue the current dose of levothyroxine  Repeat labs before next visit

## 2025-02-04 NOTE — ASSESSMENT & PLAN NOTE
Took Adipex for 6 months, last dose 2 weeks ago  We discussed portion control, increasing exercise  Restart Adipex in 3 months

## 2025-02-04 NOTE — PROGRESS NOTES
Chief Complaint  Hashimoto's Thyroiditis (Need refill on thyroid medication.)    Subjective        Starr Ramos presents to [facility] ENDOCRINOLOGY for follow up   History of Present Illness             Hashimoto's thyroiditis  Takes levothyroxine 25 mcg daily     Takes vitamin D 2000 units daily.     Took Adipex for weight loss: Tolerated it well.  Lost about 16 pounds  Last dose 2 weeks ago   S/p  tubal ligation          Thyroid nodule     Denies radiation exposure  Denies neck pain or difficulty swallowing  Denies family history of thyroid cancer  Thyroid ultrasound in December 2023 showed: 1. Isthmic nodule, TI-RADS-4. Consider 1 year follow-up if  clinically indicated  2.Suggestive of thyroiditis.    Thyroid us 12/2024: Heterogenous thyroid echotexture, vascularity normal to  slightly increased. Stable TI-RADS category 4 isthmus nodule which does  not qualify for fine-needle aspiration. Continue conservative  sonographic surveillance with 1-year follow-up.        Component      Latest Ref Rng 1/28/2025   Glucose      65 - 99 mg/dL 96    BUN      6 - 20 mg/dL 12    Creatinine      0.57 - 1.00 mg/dL 0.78    EGFR Result      >60.0 mL/min/1.73 96.2    BUN/Creatinine Ratio      7.0 - 25.0  15.4    Sodium      136 - 145 mmol/L 140    Potassium      3.5 - 5.2 mmol/L 4.3    Chloride      98 - 107 mmol/L 104    CO2      22.0 - 29.0 mmol/L 25.1    Calcium      8.6 - 10.5 mg/dL 9.7    Total Protein      6.0 - 8.5 g/dL 7.1    Albumin      3.5 - 5.2 g/dL 4.4    Globulin      gm/dL 2.7    A/G Ratio      g/dL 1.6    Total Bilirubin      0.0 - 1.2 mg/dL <0.2    Alkaline Phosphatase      39 - 117 U/L 69    AST (SGOT)      1 - 32 U/L 12    ALT (SGPT)      1 - 33 U/L 10    25 Hydroxy, Vitamin D      30.0 - 100.0 ng/ml 26.6 (L)    TSH Baseline      0.270 - 4.200 uIU/mL 4.190    Free T4      0.92 - 1.68 ng/dL 1.09       Legend:  (L) Low       Objective   Vital Signs:  /90   Pulse 82   Temp 97.1 °F (36.2  "°C) (Oral)   Wt 70.9 kg (156 lb 6.4 oz)   SpO2 99%   BMI 28.60 kg/m²   Estimated body mass index is 28.6 kg/m² as calculated from the following:    Height as of 7/5/24: 157.5 cm (62.01\").    Weight as of this encounter: 70.9 kg (156 lb 6.4 oz).                   Physical Exam  Constitutional:       Appearance: Normal appearance.   Cardiovascular:      Rate and Rhythm: Normal rate.   Pulmonary:      Effort: Pulmonary effort is normal.      Breath sounds: Normal breath sounds. No wheezing.   Abdominal:      Palpations: Abdomen is soft.      Tenderness: There is no abdominal tenderness.   Musculoskeletal:         General: No swelling.      Cervical back: Neck supple. No tenderness.   Neurological:      Mental Status: She is alert and oriented to person, place, and time.   Psychiatric:         Mood and Affect: Mood normal.        Result Review :  The following data was reviewed by: Mahrokh Nokhbehzaeim, MD on 02/04/2025:  Common labs          7/5/2024    09:00 9/10/2024    12:22 9/10/2024    12:24 1/28/2025    08:37   Common Labs   Glucose 94  91   96    BUN 12  11   12    Creatinine 0.68  0.73   0.78    Sodium 140  139   140    Potassium 5.3  4.2   4.3    Chloride 108  103   104    Calcium 9.2  9.6   9.7    Total Protein 6.3  7.2   7.1    Albumin 4.3  4.7   4.4    Total Bilirubin 0.2  0.3   <0.2    Alkaline Phosphatase 58  50   69    AST (SGOT) 15  14   12    ALT (SGPT) 13  12   10    Total Cholesterol 175   182     Triglycerides 134   104     HDL Cholesterol 49   50     LDL Cholesterol  102   113       CMP          7/5/2024    09:00 9/10/2024    12:22 1/28/2025    08:37   CMP   Glucose 94  91  96    BUN 12  11  12    Creatinine 0.68  0.73  0.78    Sodium 140  139  140    Potassium 5.3  4.2  4.3    Chloride 108  103  104    Calcium 9.2  9.6  9.7    Total Protein 6.3  7.2  7.1    Albumin 4.3  4.7  4.4    Globulin 2.0  2.5  2.7    Total Bilirubin 0.2  0.3  <0.2    Alkaline Phosphatase 58  50  69    AST (SGOT) 15  14  " 12    ALT (SGPT) 13  12  10    BUN/Creatinine Ratio 17.6  15  15.4      CMP          7/5/2024    09:00 9/10/2024    12:22 1/28/2025    08:37   CMP   Glucose 94  91  96    BUN 12  11  12    Creatinine 0.68  0.73  0.78    Sodium 140  139  140    Potassium 5.3  4.2  4.3    Chloride 108  103  104    Calcium 9.2  9.6  9.7    Total Protein 6.3  7.2  7.1    Albumin 4.3  4.7  4.4    Globulin 2.0  2.5  2.7    Total Bilirubin 0.2  0.3  <0.2    Alkaline Phosphatase 58  50  69    AST (SGOT) 15  14  12    ALT (SGPT) 13  12  10    BUN/Creatinine Ratio 17.6  15  15.4       TSH          7/5/2024    09:00 9/10/2024    12:22 1/28/2025    08:37   TSH   TSH 4.900  2.600  4.190       Free T4   Date Value Ref Range Status   01/28/2025 1.09 0.92 - 1.68 ng/dL Final      25 Hydroxy, Vitamin D   Date Value Ref Range Status   01/28/2025 26.6 (L) 30.0 - 100.0 ng/ml Final     Comment:     Reference Range for Total Vitamin D 25(OH)  Deficiency <20.0 ng/mL  Insufficiency 21-29 ng/mL  Sufficiency  ng/mL  Toxicity >100 ng/ml        Data reviewed : Radiologic studies Thyroid us             Assessment and Plan   Diagnoses and all orders for this visit:    1. Hashimoto's thyroiditis (Primary)  Assessment & Plan:  Recent TFT within the normal range  Continue the current dose of levothyroxine  Repeat labs before next visit    Orders:  -     TSH; Future  -     T4, Free; Future  -     levothyroxine (SYNTHROID, LEVOTHROID) 25 MCG tablet; Take 1 tablet by mouth Every Morning.  Dispense: 90 tablet; Refill: 1    2. Vitamin D deficiency  Assessment & Plan:  Increase the dose of vitamin D supplements to 3000 units daily  Recheck labs before next visit    Orders:  -     Vitamin D,25-Hydroxy; Future  -     Comprehensive Metabolic Panel; Future    3. Thyroid nodule  Assessment & Plan:  No compressive symptoms  Repeat thyroid ultrasound December 2025      4. Class 1 obesity due to excess calories without serious comorbidity with body mass index (BMI) of 31.0  to 31.9 in adult  Assessment & Plan:  Took Adipex for 6 months, last dose 2 weeks ago  We discussed portion control, increasing exercise  Restart Adipex in 3 months                 Follow Up   Return in about 3 months (around 5/4/2025).  Patient was given instructions and counseling regarding her condition or for health maintenance advice. Please see specific information pulled into the AVS if appropriate.

## 2025-05-23 ENCOUNTER — TELEPHONE (OUTPATIENT)
Dept: ENDOCRINOLOGY | Age: 45
End: 2025-05-23
Payer: COMMERCIAL

## 2025-05-30 ENCOUNTER — TELEPHONE (OUTPATIENT)
Dept: ENDOCRINOLOGY | Age: 45
End: 2025-05-30
Payer: COMMERCIAL

## 2025-07-01 ENCOUNTER — LAB (OUTPATIENT)
Facility: HOSPITAL | Age: 45
End: 2025-07-01
Payer: COMMERCIAL

## 2025-07-01 DIAGNOSIS — E55.9 VITAMIN D DEFICIENCY: Primary | ICD-10-CM

## 2025-07-01 DIAGNOSIS — E06.3 HASHIMOTO'S THYROIDITIS: ICD-10-CM

## 2025-07-01 DIAGNOSIS — E06.3 HASHIMOTO THYROIDITIS: Primary | ICD-10-CM

## 2025-07-01 LAB
25(OH)D3 SERPL-MCNC: 28.9 NG/ML (ref 30–100)
HOLD SPECIMEN: NORMAL
T4 FREE SERPL-MCNC: 0.9 NG/DL (ref 0.92–1.68)
TSH SERPL DL<=0.05 MIU/L-ACNC: 4.78 UIU/ML (ref 0.27–4.2)
WHOLE BLOOD HOLD SPECIMEN: NORMAL

## 2025-07-01 PROCEDURE — 82306 VITAMIN D 25 HYDROXY: CPT | Performed by: STUDENT IN AN ORGANIZED HEALTH CARE EDUCATION/TRAINING PROGRAM

## 2025-07-01 PROCEDURE — 84439 ASSAY OF FREE THYROXINE: CPT | Performed by: STUDENT IN AN ORGANIZED HEALTH CARE EDUCATION/TRAINING PROGRAM

## 2025-07-01 PROCEDURE — 84443 ASSAY THYROID STIM HORMONE: CPT | Performed by: STUDENT IN AN ORGANIZED HEALTH CARE EDUCATION/TRAINING PROGRAM

## 2025-07-01 PROCEDURE — 36415 COLL VENOUS BLD VENIPUNCTURE: CPT | Performed by: STUDENT IN AN ORGANIZED HEALTH CARE EDUCATION/TRAINING PROGRAM

## 2025-07-17 ENCOUNTER — OFFICE VISIT (OUTPATIENT)
Dept: ENDOCRINOLOGY | Age: 45
End: 2025-07-17
Payer: COMMERCIAL

## 2025-07-17 VITALS
DIASTOLIC BLOOD PRESSURE: 80 MMHG | HEART RATE: 94 BPM | TEMPERATURE: 97.8 F | OXYGEN SATURATION: 96 % | WEIGHT: 154.6 LBS | SYSTOLIC BLOOD PRESSURE: 132 MMHG | BODY MASS INDEX: 28.27 KG/M2

## 2025-07-17 DIAGNOSIS — E66.811 CLASS 1 OBESITY DUE TO EXCESS CALORIES WITHOUT SERIOUS COMORBIDITY WITH BODY MASS INDEX (BMI) OF 31.0 TO 31.9 IN ADULT: ICD-10-CM

## 2025-07-17 DIAGNOSIS — E55.9 VITAMIN D DEFICIENCY: ICD-10-CM

## 2025-07-17 DIAGNOSIS — E66.09 CLASS 1 OBESITY DUE TO EXCESS CALORIES WITHOUT SERIOUS COMORBIDITY WITH BODY MASS INDEX (BMI) OF 31.0 TO 31.9 IN ADULT: ICD-10-CM

## 2025-07-17 DIAGNOSIS — E04.1 THYROID NODULE: ICD-10-CM

## 2025-07-17 DIAGNOSIS — R79.89 HIGH SERUM LOW-DENSITY LIPOPROTEIN (LDL): ICD-10-CM

## 2025-07-17 DIAGNOSIS — E06.3 HASHIMOTO'S THYROIDITIS: Primary | ICD-10-CM

## 2025-07-17 PROCEDURE — 99214 OFFICE O/P EST MOD 30 MIN: CPT | Performed by: STUDENT IN AN ORGANIZED HEALTH CARE EDUCATION/TRAINING PROGRAM

## 2025-07-17 RX ORDER — PHENTERMINE HYDROCHLORIDE 37.5 MG/1
37.5 TABLET ORAL
Qty: 90 TABLET | Refills: 0 | Status: SHIPPED | OUTPATIENT
Start: 2025-07-17

## 2025-07-17 RX ORDER — LEVOTHYROXINE SODIUM 50 UG/1
50 TABLET ORAL DAILY
Qty: 90 TABLET | Refills: 1 | Status: SHIPPED | OUTPATIENT
Start: 2025-07-17 | End: 2026-07-17

## 2025-07-17 NOTE — PROGRESS NOTES
"Chief Complaint  Hashimoto's Thyroiditis    Subjective        Starr Ramos presents to [facility] ENDOCRINOLOG[facility] for follow up.     Hashimoto's Thyroiditis            Hashimoto's thyroiditis  Takes levothyroxine 25 mcg daily, takes it early morning on empty stomach before breakfast     Takes vitamin D 2000 units daily.     Took Adipex for weight loss: Tolerated it well.  Lost about 16 pounds  Off Adipex for 3 months       S/p  tubal ligation             Thyroid nodule     Denies radiation exposure  Denies neck pain or difficulty swallowing  Denies family history of thyroid cancer  Thyroid ultrasound in December 2023 showed: 1. Isthmic nodule, TI-RADS-4. Consider 1 year follow-up if clinically indicated  2.Suggestive of thyroiditis.     Thyroid us 12/2024: Heterogenous thyroid echotexture, vascularity normal to slightly increased. Stable TI-RADS category 4 isthmus nodule which does not qualify for fine-needle aspiration. Continue conservative sonographic surveillance with 1-year follow-up.     Cardiac scan November 2024 Calcium score (Agatston): 0, no identifiable plaque.         Component      Latest Ref Rng 7/1/2025   TSH Baseline      0.270 - 4.200 uIU/mL 4.780 (H)    Free T4      0.92 - 1.68 ng/dL 0.90 (L)    25 Hydroxy, Vitamin D      30.0 - 100.0 ng/ml 28.9 (L)       Legend:  (H) High  (L) Low  Objective   Vital Signs:  /80   Pulse 94   Temp 97.8 °F (36.6 °C) (Oral)   Wt 70.1 kg (154 lb 9.6 oz)   SpO2 96%   BMI 28.27 kg/m²   Estimated body mass index is 28.27 kg/m² as calculated from the following:    Height as of 7/5/24: 157.5 cm (62.01\").    Weight as of this encounter: 70.1 kg (154 lb 9.6 oz).                   Physical Exam  Constitutional:       Appearance: Normal appearance.   Cardiovascular:      Rate and Rhythm: Normal rate.   Pulmonary:      Effort: Pulmonary effort is normal.      Breath sounds: Normal breath sounds. No wheezing.   Abdominal:      Palpations: Abdomen is " soft.      Tenderness: There is no abdominal tenderness.   Musculoskeletal:         General: No swelling.      Cervical back: Neck supple. No tenderness.   Neurological:      Mental Status: She is alert and oriented to person, place, and time.   Psychiatric:         Mood and Affect: Mood normal.        Result Review :  The following data was reviewed by: Mahrokh Nokhbehzaeim, MD on 07/17/2025:  TSH          9/10/2024    12:22 1/28/2025    08:37 7/1/2025    08:40   TSH   TSH 2.600  4.190  4.780      TSH          9/10/2024    12:22 1/28/2025    08:37 7/1/2025    08:40   TSH   TSH 2.600  4.190  4.780       Free T4   Date Value Ref Range Status   07/01/2025 0.90 (L) 0.92 - 1.68 ng/dL Final      25 Hydroxy, Vitamin D   Date Value Ref Range Status   07/01/2025 28.9 (L) 30.0 - 100.0 ng/ml Final      Data reviewed : Radiologic studies Thyroid US             Assessment and Plan   Diagnoses and all orders for this visit:    1. Hashimoto's thyroiditis (Primary)  Assessment & Plan:  Increase the dose of levothyroxine to 50 mcg daily  Recheck labs in 6 weeks      Orders:  -     levothyroxine (Synthroid) 50 MCG tablet; Take 1 tablet by mouth Daily.  Dispense: 90 tablet; Refill: 1  -     TSH; Future  -     T4, Free; Future    2. Vitamin D deficiency  Assessment & Plan:  Increase the dose of vitamin D supplements to 4000 units daily      3. Thyroid nodule  Assessment & Plan:  Repeat thyroid ultrasound December 2025    Orders:  -     US Thyroid; Future    4. Class 1 obesity due to excess calories without serious comorbidity with body mass index (BMI) of 31.0 to 31.9 in adult  Assessment & Plan:  Took Adipex for 6 months, been off for 3 months   We discussed portion control, increasing exercise  Will restart Adipex in 3 months, side effects and benefits discussed in details    Orders:  -     phentermine (Adipex-P) 37.5 MG tablet; Take 1 tablet by mouth Every Morning Before Breakfast.  Dispense: 90 tablet; Refill: 0    5. High serum  low-density lipoprotein (LDL)  Assessment & Plan:  No personal history of diabetes or heart disease  No family history of premature coronary artery disease  Cardiac scan unremarkable               Follow Up   Return in about 3 months (around 10/17/2025).  Patient was given instructions and counseling regarding her condition or for health maintenance advice. Please see specific information pulled into the AVS if appropriate.

## 2025-07-17 NOTE — ASSESSMENT & PLAN NOTE
Took Adipex for 6 months, been off for 3 months   We discussed portion control, increasing exercise  Will restart Adipex in 3 months, side effects and benefits discussed in details

## 2025-07-17 NOTE — ASSESSMENT & PLAN NOTE
No personal history of diabetes or heart disease  No family history of premature coronary artery disease  Cardiac scan unremarkable

## 2025-07-18 ENCOUNTER — PRIOR AUTHORIZATION (OUTPATIENT)
Dept: ENDOCRINOLOGY | Age: 45
End: 2025-07-18
Payer: COMMERCIAL

## 2025-07-18 NOTE — TELEPHONE ENCOUNTER
Endocrinology  Prior Authorization      Prior Authorization for Adipex-P 37.5 Tablets was submitted.    (Key: [id number])    Approved today by [employer]  Request Reference Number: [id number]. ADIPEX-P TAB 37.5MG is approved through 10/18/2025. Your patient may now fill this prescription and it will be covered.    Effective Date: 7/18/2025  Authorization Expiration Date: 10/18/2025    Noemí Davila CMA

## (undated) DEVICE — 3M(TM) TEGADERM(TM) TRANSPARENT FILM DRESSING FRAME STYLE 1627: Brand: 3M™ TEGADERM™

## (undated) DEVICE — SUT PDS 0 CTX 36IN DYED Z370T

## (undated) DEVICE — CANN VAC GYN VACURETTE BERKELEY CRV 8MM 1P/U STRL

## (undated) DEVICE — SOL IRR NACL 0.9PCT BT 1000ML

## (undated) DEVICE — KENDALL SCD EXPRESS SLEEVES, KNEE LENGTH, MEDIUM: Brand: KENDALL SCD

## (undated) DEVICE — NDL SPINE 22G 31/2IN BLK

## (undated) DEVICE — SUT GUT PLN 0 STD TIE 54IN S104H

## (undated) DEVICE — HOSE BT TO BT VAC CURETTAGE SNGL PT USE

## (undated) DEVICE — SACK GZ PERM

## (undated) DEVICE — ANTIBACTERIAL UNDYED BRAIDED (POLYGLACTIN 910), SYNTHETIC ABSORBABLE SUTURE: Brand: COATED VICRYL

## (undated) DEVICE — TBG W FLTR FOR BERKELEY SYSTEM

## (undated) DEVICE — SUT MNCRYL 0/0 CTX 36IN Y398H

## (undated) DEVICE — SUT VIC 0 CT1 36IN J946H

## (undated) DEVICE — SUT VIC 4/0 KS 27IN VCP662H

## (undated) DEVICE — STRAP STIRUP WO/ RNG

## (undated) DEVICE — SLV SCD CALF HEMOFORCE DVT THERP REPROC MD

## (undated) DEVICE — NDL BLNT 18G 1 1/2IN

## (undated) DEVICE — SYR LUERLOK 20CC BX/50

## (undated) DEVICE — Device

## (undated) DEVICE — KT ART BLD GAS QUICK DRAW

## (undated) DEVICE — ST COL BERKELY TBG

## (undated) DEVICE — SUT VIC 4/0 KS 27IN J662H

## (undated) DEVICE — CANSTR SXN UTER NO FLTR SURG

## (undated) DEVICE — SUT VIC 3/0 CTI 36IN J944H

## (undated) DEVICE — Device: Brand: PORTEX

## (undated) DEVICE — GLV SURG BIOGEL LTX PF 5 1/2

## (undated) DEVICE — LOU D & C HYSTEROSCOPY: Brand: MEDLINE INDUSTRIES, INC.